# Patient Record
Sex: FEMALE | Race: WHITE
[De-identification: names, ages, dates, MRNs, and addresses within clinical notes are randomized per-mention and may not be internally consistent; named-entity substitution may affect disease eponyms.]

---

## 2017-02-10 ENCOUNTER — HOSPITAL ENCOUNTER (INPATIENT)
Dept: HOSPITAL 47 - EC | Age: 62
LOS: 4 days | Discharge: HOME | DRG: 176 | End: 2017-02-14
Payer: COMMERCIAL

## 2017-02-10 DIAGNOSIS — I26.99: Primary | ICD-10-CM

## 2017-02-10 DIAGNOSIS — Z88.0: ICD-10-CM

## 2017-02-10 DIAGNOSIS — Z96.653: ICD-10-CM

## 2017-02-10 DIAGNOSIS — M19.90: ICD-10-CM

## 2017-02-10 DIAGNOSIS — E03.9: ICD-10-CM

## 2017-02-10 DIAGNOSIS — I82.432: ICD-10-CM

## 2017-02-10 DIAGNOSIS — Z87.891: ICD-10-CM

## 2017-02-10 DIAGNOSIS — Z82.49: ICD-10-CM

## 2017-02-10 DIAGNOSIS — E78.5: ICD-10-CM

## 2017-02-10 DIAGNOSIS — Z79.899: ICD-10-CM

## 2017-02-10 DIAGNOSIS — I10: ICD-10-CM

## 2017-02-10 DIAGNOSIS — I27.2: ICD-10-CM

## 2017-02-10 DIAGNOSIS — I87.8: ICD-10-CM

## 2017-02-10 LAB
ALP SERPL-CCNC: 69 U/L (ref 38–126)
ALT SERPL-CCNC: 65 U/L (ref 9–52)
ANION GAP SERPL CALC-SCNC: 7 MMOL/L
APTT BLD: 22.5 SEC (ref 22–30)
AST SERPL-CCNC: 48 U/L (ref 14–36)
BASOPHILS # BLD AUTO: 0.1 K/UL (ref 0–0.2)
BASOPHILS NFR BLD AUTO: 1 %
BUN SERPL-SCNC: 16 MG/DL (ref 7–17)
CALCIUM SPEC-MCNC: 9.8 MG/DL (ref 8.4–10.2)
CH: 30.9
CHCM: 32.8
CHLORIDE SERPL-SCNC: 104 MMOL/L (ref 98–107)
CK SERPL-CCNC: 54 U/L (ref 30–135)
CO2 SERPL-SCNC: 28 MMOL/L (ref 22–30)
EOSINOPHIL # BLD AUTO: 0.1 K/UL (ref 0–0.7)
EOSINOPHIL NFR BLD AUTO: 2 %
ERYTHROCYTE [DISTWIDTH] IN BLOOD BY AUTOMATED COUNT: 4.72 M/UL (ref 3.8–5.4)
ERYTHROCYTE [DISTWIDTH] IN BLOOD: 12.9 % (ref 11.5–15.5)
GLUCOSE SERPL-MCNC: 135 MG/DL (ref 74–99)
HCT VFR BLD AUTO: 44.5 % (ref 34–46)
HDW: 2.45
HGB BLD-MCNC: 14.4 GM/DL (ref 11.4–16)
INR PPP: 1 (ref ?–1.1)
LUC NFR BLD AUTO: 3 %
LYMPHOCYTES # SPEC AUTO: 1.3 K/UL (ref 1–4.8)
LYMPHOCYTES NFR SPEC AUTO: 19 %
MAGNESIUM SPEC-SCNC: 2.1 MG/DL (ref 1.6–2.3)
MCH RBC QN AUTO: 30.5 PG (ref 25–35)
MCHC RBC AUTO-ENTMCNC: 32.3 G/DL (ref 31–37)
MCV RBC AUTO: 94.3 FL (ref 80–100)
MONOCYTES # BLD AUTO: 0.4 K/UL (ref 0–1)
MONOCYTES NFR BLD AUTO: 6 %
NEUTROPHILS # BLD AUTO: 4.9 K/UL (ref 1.3–7.7)
NEUTROPHILS NFR BLD AUTO: 70 %
NON-AFRICAN AMERICAN GFR(MDRD): >60
POTASSIUM SERPL-SCNC: 5.2 MMOL/L (ref 3.5–5.1)
PROT SERPL-MCNC: 7.1 G/DL (ref 6.3–8.2)
PT BLD: 10.6 SEC (ref 9–12)
SODIUM SERPL-SCNC: 139 MMOL/L (ref 137–145)
TROPONIN I SERPL-MCNC: 0.38 NG/ML (ref 0–0.03)
WBC # BLD AUTO: 0.19 10*3/UL
WBC # BLD AUTO: 7 K/UL (ref 3.8–10.6)
WBC (PEROX): 7.91

## 2017-02-10 PROCEDURE — 93005 ELECTROCARDIOGRAM TRACING: CPT

## 2017-02-10 PROCEDURE — 93306 TTE W/DOPPLER COMPLETE: CPT

## 2017-02-10 PROCEDURE — 85730 THROMBOPLASTIN TIME PARTIAL: CPT

## 2017-02-10 PROCEDURE — 71020: CPT

## 2017-02-10 PROCEDURE — 84484 ASSAY OF TROPONIN QUANT: CPT

## 2017-02-10 PROCEDURE — 80048 BASIC METABOLIC PNL TOTAL CA: CPT

## 2017-02-10 PROCEDURE — 99291 CRITICAL CARE FIRST HOUR: CPT

## 2017-02-10 PROCEDURE — 36415 COLL VENOUS BLD VENIPUNCTURE: CPT

## 2017-02-10 PROCEDURE — 80053 COMPREHEN METABOLIC PANEL: CPT

## 2017-02-10 PROCEDURE — 85379 FIBRIN DEGRADATION QUANT: CPT

## 2017-02-10 PROCEDURE — 83880 ASSAY OF NATRIURETIC PEPTIDE: CPT

## 2017-02-10 PROCEDURE — 85025 COMPLETE CBC W/AUTO DIFF WBC: CPT

## 2017-02-10 PROCEDURE — 96376 TX/PRO/DX INJ SAME DRUG ADON: CPT

## 2017-02-10 PROCEDURE — 82550 ASSAY OF CK (CPK): CPT

## 2017-02-10 PROCEDURE — 71275 CT ANGIOGRAPHY CHEST: CPT

## 2017-02-10 PROCEDURE — 93970 EXTREMITY STUDY: CPT

## 2017-02-10 PROCEDURE — 94640 AIRWAY INHALATION TREATMENT: CPT

## 2017-02-10 PROCEDURE — 83735 ASSAY OF MAGNESIUM: CPT

## 2017-02-10 PROCEDURE — 85610 PROTHROMBIN TIME: CPT

## 2017-02-10 PROCEDURE — 82553 CREATINE MB FRACTION: CPT

## 2017-02-10 RX ADMIN — SODIUM CHLORIDE SCH MLS/HR: 450 INJECTION, SOLUTION INTRAVENOUS at 16:03

## 2017-02-10 RX ADMIN — CEFAZOLIN SCH MLS/HR: 330 INJECTION, POWDER, FOR SOLUTION INTRAMUSCULAR; INTRAVENOUS at 16:03

## 2017-02-10 RX ADMIN — IPRATROPIUM BROMIDE AND ALBUTEROL SULFATE SCH: .5; 3 SOLUTION RESPIRATORY (INHALATION) at 16:14

## 2017-02-10 RX ADMIN — ATORVASTATIN CALCIUM SCH MG: 20 TABLET, FILM COATED ORAL at 21:21

## 2017-02-10 RX ADMIN — IPRATROPIUM BROMIDE AND ALBUTEROL SULFATE SCH ML: .5; 3 SOLUTION RESPIRATORY (INHALATION) at 19:10

## 2017-02-10 RX ADMIN — IPRATROPIUM BROMIDE AND ALBUTEROL SULFATE SCH: .5; 3 SOLUTION RESPIRATORY (INHALATION) at 19:44

## 2017-02-10 RX ADMIN — IPRATROPIUM BROMIDE AND ALBUTEROL SULFATE SCH: .5; 3 SOLUTION RESPIRATORY (INHALATION) at 19:59

## 2017-02-10 NOTE — CT
EXAMINATION TYPE: CT angio chest

 

DATE OF EXAM: 2/10/2017 3:08 PM

 

COMPARISON: NONE

 

HISTORY: Left sided chest pain and difficulty breathing.

 

CT DLP: 621.00 mGycm

Automated exposure control for dose reduction was used.

 

CONTRAST: 

CTA scan of the thorax is performed with IV Contrast, patient injected with 100 mL of Omnipaque 300, 
pulmonary embolism protocol. .  

 

FINDINGS: The lungs are clear.

 

There is no significant axillary, internal mammary, mediastinal or hilar adenopathy.

 

There is extensive, bilateral pulmonary emboli. There is no significant evidence of right heart strai
n. The heart is enlarged. There is no pleural or pericardial fluid.

 

There is been a previous cholecystectomy. Visualized portions of the upper abdomen are otherwise norm
al.

 

There is hypertrophic spondylosis within the spine.

 

IMPRESSION: 

 

THIS EXAMINATION IS POSITIVE FOR EXTENSIVE MAIN PULMONARY ARTERY AND LOWER LOBE PULMONARY ARTERY EMBO
LI BILATERALLY.

## 2017-02-10 NOTE — ED
Chest Pain HPI





- General


Chief Complaint: Chest Pain


Stated Complaint: Chest Pain/SOB


Time Seen by Provider: 02/10/17 12:11


Source: patient, family, RN notes reviewed


Mode of arrival: wheelchair


Limitations: no limitations





- History of Present Illness


Initial Comments: 





This is a 61-year-old female with a necessity benign past medical history other 

than hypertension who did quit smoking about 30 years ago who states she's had 

for the past 2 weeks evidence of a racing heart and shortness of breath 

especially when climbing stairs curiously also is had a cold and cough symptoms 

she has had in the morning cough with greenish brown phlegm.  She's had no 

overt fevers chills or sweats.  Also of note however she states she just drove 

back from [place] about 2 weeks ago when the symptoms did start.  She 

denies any overt chest pain at this time she has intermittent edema in her left 

ankle but this in the [place] for many years she denies any other complaints he 

denies any leg or calf pain abdominal pain or other symptoms.  She does 

complain however of being short of breath even at rest at this time.


MD Complaint: other





- Related Data


 Home Medications











 Medication  Instructions  Recorded  Confirmed


 


Atorvastatin [Lipitor] 20 mg PO HS 02/10/17 02/10/17


 


Levothyroxine Sodium [Synthroid] 150 mcg PO DAILY 02/10/17 02/10/17


 


Metoprolol Tartrate [Lopressor] 50 mg PO DAILY 02/10/17 02/10/17











 Allergies











Allergy/AdvReac Type Severity Reaction Status Date / Time


 


Penicillins Allergy  Swelling Verified 02/10/17 12:53














Review of Systems


ROS Statement: 


Those systems with pertinent positive or pertinent negative responses have been 

documented in the HPI.





ROS Other: All systems not noted in ROS Statement are negative.





EKG Findings





- EKG Results:


EKG: interpreted by HANSA, sinus rhythm (Normal sinus rhythm rate of 76.  

Interval 134 QRS duration 78 QT/QTC of/459 appear to be normal EKG no acute ST-

T wave elevations or depressions.)





Past Medical History


Past Medical History: Chest Pain / Angina, Hyperlipidemia, Thyroid Disorder


History of Any Multi-Drug Resistant Organisms: None Reported


Past Surgical History:  Section, Cholecystectomy, Orthopedic Surgery


Past Psychological History: No Psychological Hx Reported


Smoking Status: Former smoker


Past Alcohol Use History: None Reported


Past Drug Use History: None Reported





General Exam





- General Exam Comments


Initial Comments: 





This is a well-developed well-nourished awake alert oriented 3 female


Limitations: no limitations


General appearance: alert, in no apparent distress


Head exam: Present: atraumatic, normocephalic, normal inspection


Eye exam: Present: normal appearance, PERRL, EOMI.  Absent: scleral icterus, 

conjunctival injection, periorbital swelling


ENT exam: Present: normal exam, mucous membranes moist


Neck exam: Present: normal inspection.  Absent: tenderness, meningismus, 

lymphadenopathy


Respiratory exam: Present: decreased breath sounds.  Absent: respiratory 

distress, wheezes, rales, rhonchi, stridor


Cardiovascular Exam: Present: regular rate, normal rhythm, normal heart sounds.

  Absent: systolic murmur, diastolic murmur, rubs, gallop, clicks


GI/Abdominal exam: Present: soft, normal bowel sounds.  Absent: distended, 

tenderness, guarding, rebound, rigid


Extremities exam: Present: normal inspection, full ROM, normal capillary 

refill.  Absent: tenderness, pedal edema, joint swelling, calf tenderness


Back exam: Present: normal inspection


Neurological exam: Present: alert, oriented X3, CN II-XII intact


Psychiatric exam: Present: normal affect, normal mood


Skin exam: Present: warm, dry, intact, normal color.  Absent: rash





Course


 Vital Signs











  02/10/17 02/10/17 02/10/17





  12:05 12:18 13:18


 


Temperature 98.8 F 97.4 F L 


 


Pulse Rate 78 70 81


 


Respiratory 20 24 





Rate   


 


Blood Pressure 154/80 126/73 


 


O2 Sat by Pulse 94 L 97 





Oximetry   














  02/10/17 02/10/17 02/10/17





  13:25 13:27 13:32


 


Temperature   98.1 F


 


Pulse Rate 82 80 


 


Respiratory 20  





Rate   


 


Blood Pressure 131/77  


 


O2 Sat by Pulse 97  





Oximetry   














  02/10/17 02/10/17 02/10/17





  14:05 14:07 14:47


 


Temperature   98.4 F


 


Pulse Rate 102 H 86 84


 


Respiratory 24  24





Rate   


 


Blood Pressure 136/79  128/70


 


O2 Sat by Pulse 89 L 94 L 97





Oximetry   














  02/10/17





  15:08


 


Temperature 


 


Pulse Rate 86


 


Respiratory 





Rate 


 


Blood Pressure 147/74


 


O2 Sat by Pulse 98





Oximetry 














- Reevaluation(s)


Reevaluation #1: 





02/10/17 15:38


Patient has no change in her status on reexamination no chest pain minimal 

dyspnea on resting she did go to the bathroom and did develop dyspnea however 

on exertion





Chest Pain MDM





- MDM





I did review the x-rays which are nonspecific except for nodule left upper lobe 

I did do a CAT scan of the patient there is evidence of bilateral pulmonary 

emboli I did discuss this with the radiologist.  I also discussed this with the 

patient and her .  Is also elevation of the troponin.  I did discuss the 

case with Dr. Baez.  Patient will be admitted with consultation by cardiology 

and pulmonary medicine.  Patient was started on high-dose heparin.





Critical Care Time


Critical Care Time: Yes


Critical Care Time: 





39 minutes of critical care time including initial history physical lab and x-

ray orders repeat examination with CAT scan.  Repeat examination of the patient 

is had with the patient and her  regarding findings discussed with the 

admitting physician.  Admission orders and documentation of the above.





Disposition


Clinical Impression: 


 Pulmonary embolism, Non-ST elevation myocardial infarction (NSTEMI), Dyspnea 

on exertion





Disposition: ADMITTED AS IP TO THIS HOSP


Condition: Stable

## 2017-02-10 NOTE — XR
EXAMINATION TYPE: XR chest 2V

 

DATE OF EXAM: 2/10/2017 1:12 PM

 

HISTORY:  Difficulty breathing.

 

REFERENCE: NONE.

 

FINDINGS: The heart is enlarged. There is a 2 cm nodular opacity projecting over the left upper chest
. The lungs are otherwise clear. Pleural spaces are clear.

 

IMPRESSION: 

1. CARDIOMEGALY.

2. 2 CM PULMONARY NODULE IN THE LEFT UPPER LOBE. A CT SCAN OF THE CHEST WOULD BE SUGGESTED.

## 2017-02-11 RX ADMIN — CEFAZOLIN SCH MLS/HR: 330 INJECTION, POWDER, FOR SOLUTION INTRAMUSCULAR; INTRAVENOUS at 15:57

## 2017-02-11 RX ADMIN — ATORVASTATIN CALCIUM SCH MG: 20 TABLET, FILM COATED ORAL at 21:36

## 2017-02-11 RX ADMIN — IPRATROPIUM BROMIDE AND ALBUTEROL SULFATE SCH ML: .5; 3 SOLUTION RESPIRATORY (INHALATION) at 15:20

## 2017-02-11 RX ADMIN — IPRATROPIUM BROMIDE AND ALBUTEROL SULFATE SCH ML: .5; 3 SOLUTION RESPIRATORY (INHALATION) at 11:38

## 2017-02-11 RX ADMIN — IPRATROPIUM BROMIDE AND ALBUTEROL SULFATE SCH ML: .5; 3 SOLUTION RESPIRATORY (INHALATION) at 19:16

## 2017-02-11 RX ADMIN — LEVOTHYROXINE SODIUM SCH MCG: 75 TABLET ORAL at 05:43

## 2017-02-11 RX ADMIN — IPRATROPIUM BROMIDE AND ALBUTEROL SULFATE SCH ML: .5; 3 SOLUTION RESPIRATORY (INHALATION) at 07:30

## 2017-02-11 RX ADMIN — SODIUM CHLORIDE SCH MLS/HR: 450 INJECTION, SOLUTION INTRAVENOUS at 15:56

## 2017-02-11 RX ADMIN — SODIUM CHLORIDE SCH MLS/HR: 450 INJECTION, SOLUTION INTRAVENOUS at 04:51

## 2017-02-11 RX ADMIN — METOPROLOL TARTRATE SCH MG: 50 TABLET, FILM COATED ORAL at 08:40

## 2017-02-11 NOTE — CONS
DATE OF CONSULTATION:   



CHIEF COMPLAINT: Shortness of breath, palpitations and syncope. 



Fabienne is a 61-year-old lady with history of hypothyroidism, 

dyslipidemia, and palpitations who presented to the hospital having 

had an episode of sustained palpitations, chest tightness and syncope 

while she was having a bowel movement. The patient has been having on 

and off episodes of palpitations,  but the most recent episode came on 

at rest with severe in intensity and without any clear-cut relieving 

or exacerbating factors. She came to hospital. Her EKG did not reveal 

ischemic changes. She has had troponin that was elevated. A D-dimer 

that was 5.22, went on to have a CT scan of the lungs that showed 

extensive pulmonary embolism involving the main pulmonary artery and 

lower lobe pulmonary arteries. She is treated with intravenous heparin 

and at the time of my evaluation this morning appears comfortable at 

rest. Does not seem to be in respiratory distress. She is not 

tachycardic and remains hemodynamically stable. Chest x-ray showed 

mild cardiomegaly.  



Past medical history is significant for hypothyroidism, dyslipidemia, 

hypertension and palpitations.  



Medications at home include Lopressor 50 mg daily, Synthroid and 

Lipitor.  



Allergic to PENICILLIN. 



Family history is negative for premature coronary artery disease. 



SOCIAL HISTORY: Negative for smoking, EtOH abuse or drug abuse. 



REVIEW OF SYSTEMS: 

HEENT: Unremarkable. 

CARDIAC: As described above. 

RESPIRATORY: As described above. 

GI: Negative. 

GENITOURINARY: Negative. 

ALLERGY/IMMUNOLOGY: Negative. 

MUSCULOSKELETAL:  Negative. 

ENDOCRINE: Negative. 

HEMATOLOGICAL:  Negative. 

DERMATOLOGY: Negative. 

CONSTITUTIONAL: Negative. 

ONCOLOGICAL: Negative. 



The rest of the system review is not relevant. 



On exam, comfortable at rest. Vital signs are stable. There is no 

jugular venous distention. Chest exam reveals good air entry 

bilaterally. Heart exam reveals first and second heart sounds. No 

gallop. No murmur. Abdomen is soft, nontender. Exam of extremities did 

not reveal edema. Peripheral pulses are felt.  



Labs are as described. Hemoglobin is 14.4. Creatinine is 0.9. 

Troponins elevated as described above. BNP is 101.  



ASSESSMENT: 

1. Syncope secondary to large pulmonary embolism. 

2. Palpitations, probably related to it. 



PLAN: I am going to obtain a 2-D echo to assess LV function and to 

assess her RV strain, and pulmonary hypertension. She is on heparin 

which we are going to continue and we can switch her to Xarelto. I 

will obtain a venous duplex study to rule out DVT.

## 2017-02-11 NOTE — ECHOF
Referral Reason:PE



MEASUREMENTS

--------

HEIGHT: 167.6 cm

WEIGHT: 117.9 kg

BP: 112/82

IVSd:   1.0 cm     (0.6 - 1.1)

LVIDd:   4.1 cm     (3.9 - 5.3)

LVPWd:   1.0 cm     (0.6 - 1.1)

LVIDs:   2.8 cm

LA Diam:   3.3 cm     (2.7 - 3.8)

RVIDd:   3.6 cm     (< 3.3)

LAESV Index (A-L):   17.42 ml/m

Ao Diam:   3.0 cm     (2.0 - 3.7)

AV Cusp:   2.2 cm     (1.5 - 2.6)

EPSS:   0.5 cm

MV E Everardo:   0.47 m/s

MV DecT:   271 ms

MV A Everardo:   0.75 m/s

MV E/A Ratio:   0.62 

RAP:   5.00 mmHg

RVSP:   37.00 mmHg

MV EF SLOPE:   47.02 mm/s     (70 - 150)

MV EXCURSION:   22.13 mm     (> 18.000)







FINDINGS

--------

Sinus rhythm.

This was a technically adequate study.

The left ventricular size is normal.   Left ventricular 

wall thickness is normal.   Overall left ventricular 

systolic function is normal with, an EF between 60 - 65 

%.

The right ventricle is mildly enlarged.

Normal LA  size by volume 22+/-6 ml/m2.

The right atrium is normal in size.

The aortic valve is trileaflet and appears structurally 

normal.

Mild mitral annular calcification present.

Mild tricuspid regurgitation present.   There is mild 

pulmonary hypertension.   The right ventricular 

systolic pressure, as measured by Doppler, is 37.00mmHg.

The pulmonic valve is normal.   There is no pulmonic 

regurgitation present.

The aortic root size is normal.

There is no pericardial effusion.



CONCLUSIONS

--------

1. Sinus rhythm.

2. Mild mitral annular calcification present.

3. Mild tricuspid regurgitation present.

4. There is mild pulmonary hypertension.

5. The right ventricular systolic pressure, as measured by 

Doppler, is 37.00mmHg.

6. The pulmonic valve is normal.

7. There is no pericardial effusion.

8. This was a technically adequate study.

9. The left ventricular size is normal.

10. Left ventricular wall thickness is normal.

11. Overall left ventricular systolic function is normal 

with, an EF between 60 - 65 %.

12. The right ventricle is mildly enlarged.

13. Normal LA size by volume 22+/-6 ml/m2.

14. The right atrium is normal in size.

15. The aortic valve is trileaflet and appears structurally 

normal.





SONOGRAPHER: Amanda Valencia RDCS

## 2017-02-11 NOTE — P.CNPUL
History of Present Illness


Consult date: 17


Requesting physician: Erich Baez


Reason for consult: pulmonary embolism


Chief complaint: Chest pain and shortness of breath, and syncope


History of present illness: 


This is a 62-year-old female with history of hypertension, degenerative joint 

disease and previous bilateral knee replacements years ago, history of 

hyperlipidemia, primarily a patient of Dr. Mae in [place].  Patient has 

been traveling by car for the last 2 weeks to [place] and back to 

[place].  And over the last 2 weeks she has been experiencing episodes of 

chest tightness, and shortness of breath with palpitations.  When patient came 

back to [place], she continued to have these symptoms especially with any 

exertion and more so when she was climbing any flight of stairs.  Patient had 

an episode of syncope as she was on the toilet.  Hence she was brought into the 

ER.  Patient was noted to have elevated d-dimer, chest x-ray was unremarkable, 

but CT of the chest showed bilateral pulmonary emboli.  Patient was started on 

heparin, she was hemodynamically stable, she had some troponin leak, and she 

did not require TPA.  Patient was admitted, and this consult was initiated.  No 

previous history of pulmonary embolism or DVT.  No previous history of 

hypercoagulable state.  No previous history of underlying malignancy.  And no 

family history of hypercoagulability.  No history of connective tissue disease.

  Presently patient is doing well, no cough no wheezing no shortness of breath 

no chest pain.  Patient has no shortness of breath at rest.  No headaches no 

blurred vision no dizziness.  No nausea no vomiting no abdominal pain no melena 

no hematemesis.  No dysuria and no frequency no urgency.








Review of Systems





14 point review of systems were obtained, please refer to pertinent positives 

and negatives in HPI.





Past Medical History


Past Medical History: Chest Pain / Angina, Hyperlipidemia, Osteoarthritis (OA), 

Pneumonia, Thyroid Disorder


Additional Past Medical History / Comment(s): PAPITATIONS,


History of Any Multi-Drug Resistant Organisms: None Reported


Past Surgical History:  Section, Cholecystectomy, Hernia Repair, 

Orthopedic Surgery, Tonsillectomy


Additional Past Surgical History / Comment(s): ONE DENTAL IMPLANT, COLONOSCOPY,

HEMORRHOIDECTOMY,PILONIDAL CYST REMOVED AGE 17, CERVICAL BX-PRE CANCEROUS CELLS-

HAD PROCEDURE , HAD BROKEN IUD REMOVED AND PT STATED THEY WENT IN LIKE HAVING A 

 TO REMOVED BROKEN PEICES,DONNA KNEE REPLACEMENTS


Past Anesthesia/Blood Transfusion Reactions: No Reported Reaction


Past Psychological History: No Psychological Hx Reported


Smoking Status: Former smoker


Past Alcohol Use History: Rare


Additional Past Alcohol Use History / Comment(s): STARTED SMOKING AT AGE 17, 

SMOKED 1PPD ,QUIT 


Past Drug Use History: None Reported





- Past Family History


  ** Father


Family Medical History: Congestive Heart Failure (CHF)


Additional Family Medical History / Comment(s):  AGE 78





  ** Mother


Additional Family Medical History / Comment(s): PARKINSONS- AGE 74.     AN 

AUNT HAD BRAIN AND ABD ANUERYSM.





Medications and Allergies


 Home Medications











 Medication  Instructions  Recorded  Confirmed  Type


 


Atorvastatin [Lipitor] 20 mg PO HS 02/10/17 02/10/17 History


 


Levothyroxine Sodium [Synthroid] 150 mcg PO DAILY 02/10/17 02/10/17 History


 


Metoprolol Tartrate [Lopressor] 50 mg PO DAILY 02/10/17 02/10/17 History











 Allergies











Allergy/AdvReac Type Severity Reaction Status Date / Time


 


Penicillins Allergy  Swelling Verified 02/10/17 12:53














Physical Exam


Vitals: 


 Vital Signs











  Temp Pulse Pulse Pulse Resp BP BP


 


 17 11:49   86     


 


 17 11:38   86     


 


 17 08:00  98.3 F    86  18   112/82


 


 17 07:37   85     


 


 17 07:30   85     


 


 17 04:00     81  18   118/71


 


 17 00:00     86  18   122/70


 


 02/10/17 20:00  98.5 F   87  92  18   123/68


 


 02/10/17 19:20   88     


 


 02/10/17 19:10   88     


 


 02/10/17 17:10  98.2 F   87   16   140/87


 


 02/10/17 16:10  98.1 F  88    20  134/66 














  Pulse Ox


 


 17 11:49 


 


 17 11:38 


 


 17 08:00  95


 


 17 07:37 


 


 17 07:30 


 


 17 04:00  96


 


 17 00:00  95


 


 02/10/17 20:00  97


 


 02/10/17 19:20 


 


 02/10/17 19:10 


 


 02/10/17 17:10  94 L


 


 02/10/17 16:10  97








 Intake and Output











 02/10/17 02/11/17 02/11/17





 22:59 06:59 14:59


 


Intake Total 180 544.232 100


 


Balance 180 544.232 100


 


Intake:   


 


  IV   60


 


    Heparin Sodium,Porcine/   60





    D5w Pmx 25,000 unit In   





    Dextrose/Water 1 500ml.   





    bag @ 18 UNITS/KG/HR 42.   





    45 mls/hr IV .[id number] ANDREW   





    Rx#:[id number]   


 


  Intake, IV Titration  544.232 40





  Amount   


 


    Heparin Sodium,Porcine/  544.232 





    D5w Pmx 25,000 unit In   





    Dextrose/Water 1 500ml.   





    bag @ 18 UNITS/KG/HR 42.   





    45 mls/hr IV .[id number] ANDREW   





    Rx#:[id number]   


 


    Sodium Chloride 0.9% 1,   40





    000 ml @ 20 mls/hr IV .   





    Q24H ANDREW Rx#:[id number]   


 


  Oral 180  0


 


Other:   


 


  # Voids 0 0 0


 


  Weight   117.934 kg








 Patient Weight











 17





 06:59


 


Weight 117.934 kg














Physical Exam


HEENT:[Neck is supple.] [No neck masses.] [No thyromegaly.] [No JVD.]


Chest: [Clear throughout, no crackles, no rhonchi, no wheezes.]


Cardiac Exam: [Normal S1 and S2, no S3 gallop, no murmur.]


Abdomen: [Soft, nontender,  no megaly, no rebound, no guarding, normal bowel 

sounds.]


Extremities: [No clubbing, slight edema noted in the left lower extremity above 

the left ankle and according to the patient that is chronic., no cyanosis.]


Neurological Exam: [No focal neurologic deficit.]





Results





- Laboratory Findings


CBC and BMP: 


 02/10/17 13:14





 02/10/17 13:14


PT/INR, D-dimer











PT  10.6 sec (9.0-12.0)   02/10/17  13:14    


 


INR  1.0  (<1.1)   02/10/17  13:14    


 


D-Dimer  5.22 mg/L FEU (<0.60)  H  02/10/17  13:14    








Abnormal lab findings: 


 Abnormal Labs











  02/10/17 02/10/17 02/11/17





  19:15 22:12 00:50


 


APTT   94.8 H 


 


Troponin I  0.544 H*   0.423 H*














  02/11/17





  05:30


 


APTT  62.8 H


 


Troponin I 














- Diagnostic Findings


CT scan - chest: image reviewed (Multiple bilateral pulmonary emboli noted.)





Assessment and Plan


Plan: 





Impression:





1 acute bilateral pulmonary embolism, provoked by recent long car travel.  And 

venous stasis.





2 acute troponin leak most likely secondary to the volume load of the pulmonary 

emboli.





3 history of multiple comorbidities including essential hypertension, 

hyperlipidemia, hypothyroidism, and history of degenerative joint disease.





Recommendation: Patient is yet to have an echocardiogram today, and she is yet 

to have bilateral venous Doppler, in the meantime I fully agree with the 

treatment plan, however the patient demonstrates any hemodynamic instability 

whatsoever, I would be inclined to start the patient on TPA.  At least at this 

point she is stable enough and does not require TPA.  We'll continue to follow 

closely.  Early next week, the patient could be started on possibly Xarelto.  

Length of treatment would have to be at least a 3-6 months.


Time with Patient: Greater than 30

## 2017-02-11 NOTE — P.HPIM
History of Present Illness


H&P Date: 17


Chief Complaint: Chest pain and shortness of breath





 Patient is a 61 year old female who presented to ER with a chief complaint of 

chest pain and shortness of breath she states that she traveled from [place] 

[place] to [place] in a car which was at 10-12 hour right 2 weeks ago she 

started having episodes of chest pain and feeling her heart racing she started 

having worsening shortness of breath eventually she decided to come to 

emergency room she was evaluated in the emergency room her d-dimer was 

significantly elevated at 5.22, computed tomography scan angiogram of the chest 

was positive for extensive bilateral pulmonary embolism she was started on IV 

heparin and was admitted to the telemetry floor.  Pulmonary consultation was 

requested.  Patient also had slightly elevated troponin level.





Past medical and surgical history is significant for


#1 history of hypertension


#2 history of osteoarthritis, status post bilateral knee surgeries


#3 history of cholecystectomy


#4 history of hernia repair


#5 history of hyperlipidemia maintained on Lipitor





Social history


Patient lives at home she is able to ambulate and take care of her daily needs 

she use to smoke she quit 30 years ago she drinks alcohol rarely she denies any 

drug use





Past Medical History


Past Medical History: Chest Pain / Angina, Hyperlipidemia, Osteoarthritis (OA), 

Pneumonia, Thyroid Disorder


Additional Past Medical History / Comment(s): PAPITATIONS,


History of Any Multi-Drug Resistant Organisms: None Reported


Past Surgical History:  Section, Cholecystectomy, Hernia Repair, 

Orthopedic Surgery, Tonsillectomy


Additional Past Surgical History / Comment(s): ONE DENTAL IMPLANT, COLONOSCOPY,

HEMORRHOIDECTOMY,PILONIDAL CYST REMOVED AGE 17, CERVICAL BX-PRE CANCEROUS CELLS-

HAD PROCEDURE , HAD BROKEN IUD REMOVED AND PT STATED THEY WENT IN LIKE HAVING A 

 TO REMOVED BROKEN PEICES,DONNA KNEE REPLACEMENTS


Past Anesthesia/Blood Transfusion Reactions: No Reported Reaction


Past Psychological History: No Psychological Hx Reported


Smoking Status: Former smoker


Past Alcohol Use History: Rare


Additional Past Alcohol Use History / Comment(s): STARTED SMOKING AT AGE 17, 

SMOKED 1PPD ,QUIT 


Past Drug Use History: None Reported





- Past Family History


  ** Father


Family Medical History: Congestive Heart Failure (CHF)


Additional Family Medical History / Comment(s):  AGE 78





  ** Mother


Additional Family Medical History / Comment(s): PARKINSONS- AGE 74.     AN 

AUNT HAD BRAIN AND ABD ANUERYSM.





Medications and Allergies


 Home Medications











 Medication  Instructions  Recorded  Confirmed  Type


 


Atorvastatin [Lipitor] 20 mg PO HS 02/10/17 02/10/17 History


 


Levothyroxine Sodium [Synthroid] 150 mcg PO DAILY 02/10/17 02/10/17 History


 


Metoprolol Tartrate [Lopressor] 50 mg PO DAILY 02/10/17 02/10/17 History











 Allergies











Allergy/AdvReac Type Severity Reaction Status Date / Time


 


Penicillins Allergy  Swelling Verified 02/10/17 12:53














Physical Exam


Vitals: 


 Vital Signs











  Temp Pulse Pulse Pulse Resp BP BP


 


 17 08:00  98.3 F    86  18   112/82


 


 17 07:37   85     


 


 17 07:30   85     


 


 17 04:00     81  18   118/71


 


 17 00:00     86  18   122/70


 


 02/10/17 20:00  98.5 F   87  92  18   123/68


 


 02/10/17 19:20   88     


 


 02/10/17 19:10   88     


 


 02/10/17 17:10  98.2 F   87   16   140/87


 


 02/10/17 16:10  98.1 F  88    20  134/66 














  Pulse Ox


 


 17 08:00  95


 


 17 07:37 


 


 17 07:30 


 


 17 04:00  96


 


 17 00:00  95


 


 02/10/17 20:00  97


 


 02/10/17 19:20 


 


 02/10/17 19:10 


 


 02/10/17 17:10  94 L


 


 02/10/17 16:10  97








 Intake and Output











 02/10/17 02/11/17 02/11/17





 22:59 06:59 14:59


 


Intake Total 180 544.232 100


 


Balance 180 544.232 100


 


Intake:   


 


  IV   60


 


    Heparin Sodium,Porcine/   60





    D5w Pmx 25,000 unit In   





    Dextrose/Water 1 500ml.   





    bag @ 18 UNITS/KG/HR 42.   





    45 mls/hr IV .[id number] [facility]   





    Rx#:[id number]   


 


  Intake, IV Titration  544.232 40





  Amount   


 


    Heparin Sodium,Porcine/  544.232 





    D5w Pmx 25,000 unit In   





    Dextrose/Water 1 500ml.   





    bag @ 18 UNITS/KG/HR 42.   





    45 mls/hr IV .[id number] [facility]   





    Rx#:[id number]   


 


    Sodium Chloride 0.9% 1,   40





    000 ml @ 20 mls/hr IV .   





    Q24H [facility] Rx#:[id number]   


 


  Oral 180  0


 


Other:   


 


  # Voids 0 0 0


 


  Weight   117.934 kg








 Patient Weight











 17





 06:59


 


Weight 117.934 kg














In general patient is alert and oriented 3 in no apparent distress


HEENT head normocephalic and atraumatic


Neck is supple no JVD no goiter no lymphadenopathy


Chest exam reveals a scattered crackles bilaterally no wheezing


Cardiac exam reveals regular heart sounds S1 and S2 no gallops no murmurs


Abdomen is soft nontender no organomegaly was normal bowel sounds


Extremity exam reveals minimal edema especially in the left ankle area no 

cyanosis or clubbing





Results


CBC & Chem 7: 


 02/10/17 13:14





 02/10/17 13:14


Labs: 


 Abnormal Lab Results - Last 24 Hours (Table)











  02/10/17 02/10/17 02/11/17 Range/Units





  19:15 22:12 00:50 


 


APTT   94.8 H   (22.0-30.0)  sec


 


Troponin I  0.544 H*   0.423 H*  (0.000-0.034)  ng/mL














  17 Range/Units





  05:30 


 


APTT  62.8 H  (22.0-30.0)  sec


 


Troponin I   (0.000-0.034)  ng/mL














Assessment and Plan


Plan: 





#1 bilateral pulmonary embolism, patient started on IV heparin, pulmonary 

consultation was requested


#2 slight elevation in troponin level, cardiology consultation was requested 

likely related to pulmonary embolism


#3 underlying history of hypertension


#4 underlying history of hyperlipidemia


#5 underlying history of hypothyroidism


#6 remote history of smoking quit 30 years ago


#7 underlying history of osteoarthritis


At this time continue was current medications, will assess if patient insurance 

pays for any of the new anticoagulation medications, if not patient will be 

started on Coumadin.

## 2017-02-12 RX ADMIN — SODIUM CHLORIDE SCH MLS/HR: 450 INJECTION, SOLUTION INTRAVENOUS at 23:05

## 2017-02-12 RX ADMIN — IPRATROPIUM BROMIDE AND ALBUTEROL SULFATE SCH ML: .5; 3 SOLUTION RESPIRATORY (INHALATION) at 20:39

## 2017-02-12 RX ADMIN — METOPROLOL TARTRATE SCH MG: 50 TABLET, FILM COATED ORAL at 09:04

## 2017-02-12 RX ADMIN — ATORVASTATIN CALCIUM SCH MG: 20 TABLET, FILM COATED ORAL at 20:58

## 2017-02-12 RX ADMIN — IPRATROPIUM BROMIDE AND ALBUTEROL SULFATE SCH ML: .5; 3 SOLUTION RESPIRATORY (INHALATION) at 16:24

## 2017-02-12 RX ADMIN — SODIUM CHLORIDE SCH MLS/HR: 450 INJECTION, SOLUTION INTRAVENOUS at 03:55

## 2017-02-12 RX ADMIN — LEVOTHYROXINE SODIUM SCH MCG: 75 TABLET ORAL at 05:40

## 2017-02-12 RX ADMIN — CEFAZOLIN SCH MLS/HR: 330 INJECTION, POWDER, FOR SOLUTION INTRAMUSCULAR; INTRAVENOUS at 16:14

## 2017-02-12 RX ADMIN — IPRATROPIUM BROMIDE AND ALBUTEROL SULFATE SCH ML: .5; 3 SOLUTION RESPIRATORY (INHALATION) at 13:18

## 2017-02-12 RX ADMIN — SODIUM CHLORIDE SCH MLS/HR: 450 INJECTION, SOLUTION INTRAVENOUS at 16:13

## 2017-02-12 RX ADMIN — IPRATROPIUM BROMIDE AND ALBUTEROL SULFATE SCH ML: .5; 3 SOLUTION RESPIRATORY (INHALATION) at 09:00

## 2017-02-12 NOTE — PN
Fabienne is an 61-year-old lady who was admitted to hospital with 

bilateral pulmonary embolism, feeling much better on IV heparin, had a 

venous duplex study that revealed left leg, left popliteal vein DVT. 

Patient has history of recent travel. Echocardiogram showed normal LV 

function.  



On exam, comfortable at rest. Vital signs are stable. 

There is no jugular venous distention. Chest exam reveals good air 

entry bilaterally. Heart exam reveals first and second heart sounds. 

No gallop. Exam of extremities did not reveal edema. Peripheral pulses 

are felt.  Occasional rhonchi are heard bilaterally.  



ASSESSMENT: 

1. Acute large bilateral pulmonary embolism. 

2. Left popliteal vein deep venous thrombosis related to recent travel.



PLAN: Will switch her to Xarelto once we know that she is covered for 

it. If not, will start her on Coumadin tomorrow.

## 2017-02-12 NOTE — US
EXAMINATION TYPE: US venous doppler duplex LE 

 

DATE OF EXAM: 2/12/2017 9:44 AM

 

COMPARISON: CTA chest dated 2/10/2017 revealing extensive pulmonary emboli.

 

CLINICAL HISTORY: dvt/pe. No leg symptoms

 

SIDE PERFORMED:

 

VESSELS IMAGED:

External Iliac Vein (EIV)

Common Femoral Vein

Deep Femoral Vein

Greater Saphenous Vein *

Femoral Vein

Popliteal Vein

Small Saphenous Vein *

Proximal Calf Veins

(* superficial vessels)

 

TECHNOLOGIST IMPRESSION:

 

Right Leg:  Appears negative for DVT

 

Left Leg:  Appears to have non occluding thrombus within left proximal popiteal vein = echogenic debr
is that is not compressible, blood flow seen going around clot

 

As described above there is echogenic debris within an enlarged proximal popliteal vein that is nonco
mpressible with peripheral flow seen on Doppler imaging. The distal popliteal and proximal calf veins
 are compressible with no echogenic intraluminal debris.

 

IMPRESSION:  

1. Nonocclusive thrombus within the proximal left popliteal vein in a patient with known pulmonary em
boli.

2. No evidence of deep venous thrombosis of the right lower extremity.

## 2017-02-12 NOTE — P.PN
Subjective


Principal diagnosis: 





Bilateral pulmonary embolism





Patient is a 61-year-old female who presented to [facility] 

was chest congestion and severe shortness of breath that was worsening over the 

last 2 weeks patient stated that she had a long drive from [place] about 

2 weeks ago, subsequently she started having worsening shortness of breath, d-

dimer was elevated at 5.2 on admission, computed tomography scan of the chest 

revealed evidence of bilateral pulmonary embolism she was started on IV heparin

, left lower extremity Doppler was positive for DVT.





Objective





- Vital Signs


Vital signs: 


 Vital Signs











Temp  98.2 F   02/12/17 08:00


 


Pulse  90   02/12/17 09:10


 


Resp  18   02/12/17 08:00


 


BP  105/55   02/12/17 08:00


 


Pulse Ox  98   02/12/17 08:00








 Intake & Output











 02/11/17 02/12/17 02/12/17





 18:59 06:59 18:59


 


Intake Total 873.207 862.532 349.306


 


Output Total   300


 


Balance 873.207 862.532 49.306


 


Weight 117.934 kg 117 kg 


 


Intake:   


 


   280 


 


    Heparin Sodium,Porcine/ 200 280 





    D5w Pmx 25,000 unit In   





    Dextrose/Water 1 500ml.   





    bag @ 18 UNITS/KG/HR 42.   





    45 mls/hr IV .[id number] [facility]   





    Rx#:[id number]   


 


  Intake, IV Titration 473.207 582.532 109.306





  Amount   


 


    Heparin Sodium,Porcine/ 333.207 422.532 109.306





    D5w Pmx 25,000 unit In   





    Dextrose/Water 1 500ml.   





    bag @ 18 UNITS/KG/HR 42.   





    45 mls/hr IV .[id number] [facility]   





    Rx#:[id number]   


 


    Sodium Chloride 0.9% 1, 140 160 





    000 ml @ 20 mls/hr IV .   





    Q24H [facility] Rx#:[id number]   


 


  Oral 200  240


 


Output:   


 


  Urine   300


 


Other:   


 


  # Voids 0 2 1














- Exam





In general patient is alert and oriented 3 in no apparent distress


HEENT head normocephalic and atraumatic


Neck is supple no JVD no goiter


Chest exam reveals a scattered crackles bilaterally no wheezing


Cardiac exam reveals regular heart sounds S1 and S2 no gallops no murmurs


Abdomen is soft nontender no organomegaly


Extremity exam reveals minimal edema left more than right





- Labs


CBC & Chem 7: 


 02/10/17 13:14





 02/10/17 13:14


Labs: 


 Abnormal Lab Results - Last 24 Hours (Table)











  02/12/17 Range/Units





  05:40 


 


APTT  46.9 H  (22.0-30.0)  sec














Assessment and Plan


Plan: 





#1 bilateral pulmonary embolism, patient started on IV heparin, pulmonary 

consultation was requested


#2 slight elevation in troponin level, cardiology consultation was requested 

likely related to pulmonary embolism


#3 underlying history of hypertension


#4 underlying history of hyperlipidemia


#5 underlying history of hypothyroidism


#6 remote history of smoking quit 30 years ago


#7 underlying history of osteoarthritis


At this time continue was current medications, will assess if patient insurance 

pays for any of the new anticoagulation medications, if not patient will be 

started on Coumadin.

## 2017-02-12 NOTE — P.PN
Subjective


Principal diagnosis: 





Acute bilateral pulmonary emboli


This is a 62-year-old female with history of hypertension, degenerative joint 

disease and previous bilateral knee replacements years ago, history of 

hyperlipidemia, primarily a patient of Dr. Mae in [place].  Patient has 

been traveling by car for the last 2 weeks to [place] and back to 

[place].  And over the last 2 weeks she has been experiencing episodes of 

chest tightness, and shortness of breath with palpitations.  When patient came 

back to [place], she continued to have these symptoms especially with any 

exertion and more so when she was climbing any flight of stairs.  Patient had 

an episode of syncope as she was on the toilet.  Hence she was brought into the 

ER.  Patient was noted to have elevated d-dimer, chest x-ray was unremarkable, 

but CT of the chest showed bilateral pulmonary emboli.  Patient was started on 

heparin, she was hemodynamically stable, she had some troponin leak, and she 

did not require TPA.  Patient was admitted, and this consult was initiated.  No 

previous history of pulmonary embolism or DVT.  No previous history of 

hypercoagulable state.  No previous history of underlying malignancy.  And no 

family history of hypercoagulability.  No history of connective tissue disease.

  Presently patient is doing well, no cough no wheezing no shortness of breath 

no chest pain.  Patient has no shortness of breath at rest.  No headaches no 

blurred vision no dizziness.  No nausea no vomiting no abdominal pain no melena 

no hematemesis.  No dysuria and no frequency no urgency.





Patient was reevaluated today on 2/12/2017, doing quite well overall.  No cough 

no wheezing no shortness of breath, she feels a bit congested.  No shortness of 

breath and no chest pain.  Her PTT is therapeutic, patient remains on heparin.  

Patient is being considered for possible discharge in the next 24-48 hours on 

Xarelto if approved by her insurance.  Length of treatment will be anywhere 

between 3-6 months.











Objective





- Vital Signs


Vital signs: 


 Vital Signs











Temp  98.2 F   02/12/17 08:00


 


Pulse  90   02/12/17 09:10


 


Resp  18   02/12/17 08:00


 


BP  105/55   02/12/17 08:00


 


Pulse Ox  98   02/12/17 08:00








 Intake & Output











 02/11/17 02/12/17 02/12/17





 18:59 06:59 18:59


 


Intake Total 873.207 862.532 349.306


 


Output Total   300


 


Balance 873.207 862.532 49.306


 


Weight 117.934 kg 117 kg 


 


Intake:   


 


   280 


 


    Heparin Sodium,Porcine/ 200 280 





    D5w Pmx 25,000 unit In   





    Dextrose/Water 1 500ml.   





    bag @ 18 UNITS/KG/HR 42.   





    45 mls/hr IV .[id number] [facility]   





    Rx#:[id number]   


 


  Intake, IV Titration 473.207 582.532 109.306





  Amount   


 


    Heparin Sodium,Porcine/ 333.207 422.532 109.306





    D5w Pmx 25,000 unit In   





    Dextrose/Water 1 500ml.   





    bag @ 18 UNITS/KG/HR 42.   





    45 mls/hr IV .[id number] [facility]   





    Rx#:[id number]   


 


    Sodium Chloride 0.9% 1, 140 160 





    000 ml @ 20 mls/hr IV .   





    Q24H [facility] Rx#:[id number]   


 


  Oral 200  240


 


Output:   


 


  Urine   300


 


Other:   


 


  # Voids 0 2 1














- Exam





HEENT:[Neck is supple.] [No neck masses.] [No thyromegaly.] [No JVD.]


Chest: [Clear throughout, no crackles, no rhonchi, no wheezes.]


Cardiac Exam: [Normal S1 and S2, no S3 gallop, no murmur.]


Abdomen: [Soft, nontender,  no megaly, no rebound, no guarding, normal bowel 

sounds.]


Extremities: [No clubbing, slight edema noted in the left lower extremity above 

the left ankle and according to the patient that is chronic., no cyanosis.]


Neurological Exam: [No focal neurologic deficit.]








- Labs


CBC & Chem 7: 


 02/10/17 13:14





 02/10/17 13:14


Labs: 


 Abnormal Lab Results - Last 24 Hours (Table)











  02/12/17 Range/Units





  05:40 


 


APTT  46.9 H  (22.0-30.0)  sec














Assessment and Plan


Plan: 





Impression:





1 acute bilateral pulmonary embolism, provoked by recent long car travel.  And 

venous stasis.





2 acute deep vein thromboses involving the left lower extremity as noted on the 

venous Doppler





3 acute troponin leak most likely secondary to the volume load of the pulmonary 

emboli.





4 mild pulmonary hypertension as noted on the echocardiogram, no significant 

strain is appreciated.  On the echocardiogram.





3 history of multiple comorbidities including essential hypertension, 

hyperlipidemia, hypothyroidism, and history of degenerative joint disease.





Recommendation: Continue heparin, continue the rest of the medications as listed

, consider starting the patient in the next 24 hours on Xarelto and plan to 

discharge the patient home on Xarelto for at least the next 6 months.  I will 

see the patient on follow-up in the next few weeks.


Time with Patient: Less than 30

## 2017-02-13 RX ADMIN — LEVOTHYROXINE SODIUM SCH MCG: 75 TABLET ORAL at 06:00

## 2017-02-13 RX ADMIN — IPRATROPIUM BROMIDE AND ALBUTEROL SULFATE SCH: .5; 3 SOLUTION RESPIRATORY (INHALATION) at 15:11

## 2017-02-13 RX ADMIN — IPRATROPIUM BROMIDE AND ALBUTEROL SULFATE SCH: .5; 3 SOLUTION RESPIRATORY (INHALATION) at 11:39

## 2017-02-13 RX ADMIN — CEFAZOLIN SCH: 330 INJECTION, POWDER, FOR SOLUTION INTRAMUSCULAR; INTRAVENOUS at 16:29

## 2017-02-13 RX ADMIN — RIVAROXABAN SCH MG: 15 TABLET, FILM COATED ORAL at 10:22

## 2017-02-13 RX ADMIN — METOPROLOL TARTRATE SCH MG: 50 TABLET, FILM COATED ORAL at 08:20

## 2017-02-13 RX ADMIN — GUAIFENESIN PRN MG: 200 SOLUTION ORAL at 22:32

## 2017-02-13 RX ADMIN — ATORVASTATIN CALCIUM SCH MG: 20 TABLET, FILM COATED ORAL at 20:04

## 2017-02-13 RX ADMIN — IPRATROPIUM BROMIDE AND ALBUTEROL SULFATE SCH: .5; 3 SOLUTION RESPIRATORY (INHALATION) at 20:04

## 2017-02-13 RX ADMIN — RIVAROXABAN SCH MG: 15 TABLET, FILM COATED ORAL at 16:37

## 2017-02-13 RX ADMIN — IPRATROPIUM BROMIDE AND ALBUTEROL SULFATE SCH: .5; 3 SOLUTION RESPIRATORY (INHALATION) at 07:50

## 2017-02-13 RX ADMIN — GUAIFENESIN PRN MG: 200 SOLUTION ORAL at 16:37

## 2017-02-13 NOTE — P.PN
Subjective





Patient was complaining of shortness of breath this morning.  She said that her 

dyspnea started when she was laying in bed.  Her oxygen saturation was around 92

% on room air.  She requested to wear her oxygen and subsequently felt better


 





Objective





- Vital Signs


Vital signs: 


 Vital Signs











Temp  96.6 F L  02/13/17 11:48


 


Pulse  76   02/13/17 11:48


 


Resp  16   02/13/17 11:48


 


BP  116/88   02/13/17 11:48


 


Pulse Ox  98   02/13/17 11:48








 Intake & Output











 02/12/17 02/13/17 02/13/17





 18:59 06:59 18:59


 


Intake Total 1663.698 071.432 4823


 


Output Total 600 200 


 


Balance 1063.698 618.456 1227


 


Weight 117 kg 123 kg 


 


Intake:   


 


   420 126


 


    Heparin Sodium,Porcine/ 160 420 126





    D5w Pmx 25,000 unit In   





    Dextrose/Water 1 500ml.   





    bag @ 18 UNITS/KG/HR 42.   





    45 mls/hr IV .[id number] [facility]   





    Rx#:[id number]   


 


  Intake, IV Titration 533.698 462.119 60





  Amount   


 


    Heparin Sodium,Porcine/ 433.698 242.119 





    D5w Pmx 25,000 unit In   





    Dextrose/Water 1 500ml.   





    bag @ 18 UNITS/KG/HR 42.   





    45 mls/hr IV .[id number] [facility]   





    Rx#:[id number]   


 


    Sodium Chloride 0.9% 1, 100 220 60





    000 ml @ 20 mls/hr IV .   





    Q24H [facility] Rx#:[id number]   


 


  Oral 970 0 1060


 


Output:   


 


  Urine 600 200 


 


Other:   


 


  # Voids 3 1 














- Exam





General:  The patient is awake and alert, in no distress


Eye: there is normal conjunctiva bilaterally.  


Neck:  The neck is supple, there is no  JVD.  


Cardiovascular:  Normal  S1-S2, no S3-S4, no murmurs.


Respiratory: Lungs clear to auscultation bilaterally


Gastrointestinal: Abdomen is soft, nontender


Musculoskeletal:  There is no pedal edema.  


Neurological:. Speech is normal. 


Skin:  Skin is warm and dry 





- Labs


CBC & Chem 7: 


 02/10/17 13:14





 02/10/17 13:14


Labs: 


 Abnormal Lab Results - Last 24 Hours (Table)











  02/13/17 Range/Units





  06:19 


 


APTT  58.7 H  (22.0-30.0)  sec














Assessment and Plan


Plan: 





#1 bilateral pulmonary embolism, patient started on Rivaroxaban


#2 slight elevation in troponin level, most likely non-thrombotic troponin leak 

cardiology consultation was requested


#3 essential hypertension


#4 underlying history of hyperlipidemia


#5 underlying history of hypothyroidism


#6 remote history of smoking quit 30 years ago


#7 underlying history of osteoarthritis





Plan for today: Patient was reassured.  Wean off oxygen and check O2 sat ration 

at rest and with ambulation on room air.  Plan to discharge home tomorrow.  

Start Robitussin as needed for cough.

## 2017-02-13 NOTE — P.PN
Subjective





Progress note dated 02/13/2017





This a 61-year-old female sustained a bilateral pulmonary emboli.  She had a 

recent long car ride from [place].  She did stop periodically long away 

but did develop bilateral pulmonary emboli.  Clot burden seemed pretty 

significant.  She's feeling well though.  She did have a clot in the back of 

her left leg behind the knee.





We talked about length of treatment.  The patient is doing well.  Probably only 

be treated about 3 months.  You could argue for 6 months only because of the 

amount of clot burden.  I think a repeat computed tomography scan will help 

with quite a bit.  We'll plan to do 110 weeks after the first one.  At which 

time, we'll decide whether or not she needs longer course of treatment.





Again she's feeling well.  No complaints at this time.











Objective





- Vital Signs


Vital signs: 


 Vital Signs











Temp  96.6 F L  02/13/17 11:48


 


Pulse  76   02/13/17 11:48


 


Resp  16   02/13/17 11:48


 


BP  116/88   02/13/17 11:48


 


Pulse Ox  98   02/13/17 11:48








 Intake & Output











 02/12/17 02/13/17 02/13/17





 18:59 06:59 18:59


 


Intake Total 1663.698 310.419 2223


 


Output Total 600 200 


 


Balance 1063.698 648.402 0007


 


Weight 117 kg 123 kg 


 


Intake:   


 


   420 126


 


    Heparin Sodium,Porcine/ 160 420 126





    D5w Pmx 25,000 unit In   





    Dextrose/Water 1 500ml.   





    bag @ 18 UNITS/KG/HR 42.   





    45 mls/hr IV .[id number] [facility]   





    Rx#:[id number]   


 


  Intake, IV Titration 533.698 462.119 60





  Amount   


 


    Heparin Sodium,Porcine/ 433.698 242.119 





    D5w Pmx 25,000 unit In   





    Dextrose/Water 1 500ml.   





    bag @ 18 UNITS/KG/HR 42.   





    45 mls/hr IV .[id number] [facility]   





    Rx#:[id number]   


 


    Sodium Chloride 0.9% 1, 100 220 60





    000 ml @ 20 mls/hr IV .   





    Q24H [facility] Rx#:[id number]   


 


  Oral 970 0 1060


 


Output:   


 


  Urine 600 200 


 


Other:   


 


  # Voids 3 1 














- Exam





No acute distress, oriented 3.





HEENT examination is grossly unremarkable.  Mucous membranes are moist.





Supple.  Full range of motion.  No adenopathy or thyromegaly.





Cardio vascular examination was regular rhythm rate.  S1-S2 normal.  No murmur.





Lungs reveal relatively clear breath sounds.  No wheezes or rhonchi.  No 

crackles.





Abdomen soft bowel sounds are heard.





Extremities are intact.





- Labs


CBC & Chem 7: 


 02/10/17 13:14





 02/10/17 13:14


Labs: 


 Abnormal Lab Results - Last 24 Hours (Table)











  02/13/17 Range/Units





  06:19 


 


APTT  58.7 H  (22.0-30.0)  sec














Assessment and Plan


(1) DVT (deep venous thrombosis)


Status: Acute   





(2) Dyspnea on exertion


Status: Acute   





(3) Pulmonary embolism


Status: Acute   


Plan: 





Plan dated 02/13/2017





The patient's just finishing off her IV heparin.  This was started on Zarrella 

toe.  She should be treated for at least 3 months.  He may argue for a longer 

period to treatment.  This will depend on the follow-up computed tomography 

scan and also I'll see whether or not she had a N-terminal proBNP done and 

whether or not she had troponins done.  In addition, did she have a cardiac 

echo done.  This may lead some credence to a longer period of  treatment if she 

in fact had evidence of right ventricular strain.





The patient should follow up with us in the office in about 3 weeks.  Follow-up 

computed tomography scan in about 10 weeks after the initial computed 

tomography scan.  We'll discuss treatment with the patient at that time.  

Additional recommendations suggestions are forthcoming.  Follow-up with deftly 

necessary.


Time with Patient: Less than 30

## 2017-02-13 NOTE — P.PN
Subjective


Principal diagnosis: 





BiLateral pulmonary embolism


This is a 61-year-old  female with history of hypertension, 

hyperlipidemia, degenerative joint disease, who had been traveling by car for 

the last 2 weeks to [place] and back to [place].  Over the past 2 weeks

, she states that she had been experiencing episodes of chest discomfort and 

shortness of breath with occasional palpitations.  She presented to the 

hospital following an episode of syncope.  She was noted to have an elevated d-

dimer, chest x-ray was unremarkable, but the CAT scan of the chest did reveal 

bilateral pulmonary embolisms.  She was initiated on IV heparin.  

Echocardiogram with Doppler study was performed which revealed an ejection 

fraction of 60-65%.  Blood pressure 116/88 with a heart rate in the 60s.  

Patient was seen and examined this morning, she states earlier in the morning 

she had an episode where she felt short of breath, oxygen supply.  Stable at 

the time of my examination.  Also states she had one episode of palpitations, 

telemetry monitor at that time showed a normal sinus rhythm.








Objective





- Vital Signs


Vital signs: 


 Vital Signs











Temp  96.6 F L  02/13/17 11:48


 


Pulse  76   02/13/17 11:48


 


Resp  16   02/13/17 11:48


 


BP  116/88   02/13/17 11:48


 


Pulse Ox  98   02/13/17 11:48








 Intake & Output











 02/12/17 02/13/17 02/13/17





 18:59 06:59 18:59


 


Intake Total 1663.698 826.669 1794


 


Output Total 600 200 


 


Balance 1063.698 476.411 1490


 


Weight 117 kg 123 kg 


 


Intake:   


 


   420 126


 


    Heparin Sodium,Porcine/ 160 420 126





    D5w Pmx 25,000 unit In   





    Dextrose/Water 1 500ml.   





    bag @ 18 UNITS/KG/HR 42.   





    45 mls/hr IV .[id number] ANDREW   





    Rx#:[id number]   


 


  Intake, IV Titration 533.698 462.119 60





  Amount   


 


    Heparin Sodium,Porcine/ 433.698 242.119 





    D5w Pmx 25,000 unit In   





    Dextrose/Water 1 500ml.   





    bag @ 18 UNITS/KG/HR 42.   





    45 mls/hr IV .[id number] NADREW   





    Rx#:[id number]   


 


    Sodium Chloride 0.9% 1, 100 220 60





    000 ml @ 20 mls/hr IV .   





    Q24H ANDREW Rx#:[id number]   


 


  Oral 970 0 1060


 


Output:   


 


  Urine 600 200 


 


Other:   


 


  # Voids 3 1 














- Exam





PHYSICAL EXAMINATION: 





HEENT: [Head is atraumatic, normocephalic.  Pupils equal, round.  Neck is 

supple.  There is no elevated jugular venous pressure.]





HEART EXAMINATION: [Heart S1, S2 normal.  No murmur or gallop heard.]





CHEST EXAMINATION:[ Lungs are clear to auscultation and precussion. No chest 

wall tenderness is noted on palpation or with deep breathing.]





ABDOMEN: [ Soft, nontender. Bowel sounds are heard. No organomegaly noted].


 


EXTREMITIES:[ 2+ peripheral pulses with no evidence of peripheral edema and no 

calf tenderness noted].





NEUROLOGIC [patient is awake, alert and oriented -3.]


 


.


 








- Labs


CBC & Chem 7: 


 02/10/17 13:14





 02/10/17 13:14


Labs: 


 Abnormal Lab Results - Last 24 Hours (Table)











  02/13/17 Range/Units





  06:19 


 


APTT  58.7 H  (22.0-30.0)  sec














Assessment and Plan


(1) Bilateral pulmonary embolism


Status: Acute   





(2) HTN (hypertension)


Status: Acute   





(3) Hyperlipemia


Status: Acute   





(4) DVT (deep venous thrombosis)


Status: Acute   


Plan: 


Cardiology's perspective, we'll discontinue the IV heparin and start the 

patient on xarelto 15 mg one tablet by mouth twice a day for 21 days, then 20 

mg daily.  Continue to monitor for another 24-48 hours.








DNP note has been reviewed, I agree with a documented findings and plan of 

care.  Patient was seen and examined.

## 2017-02-14 VITALS — RESPIRATION RATE: 18 BRPM

## 2017-02-14 VITALS — DIASTOLIC BLOOD PRESSURE: 67 MMHG | SYSTOLIC BLOOD PRESSURE: 121 MMHG | TEMPERATURE: 97.1 F | HEART RATE: 59 BPM

## 2017-02-14 LAB
ANION GAP SERPL CALC-SCNC: 9 MMOL/L
BASOPHILS # BLD AUTO: 0.1 K/UL (ref 0–0.2)
BASOPHILS NFR BLD AUTO: 1 %
BUN SERPL-SCNC: 13 MG/DL (ref 7–17)
CALCIUM SPEC-MCNC: 9.4 MG/DL (ref 8.4–10.2)
CH: 31.1
CHCM: 32.8
CHLORIDE SERPL-SCNC: 108 MMOL/L (ref 98–107)
CO2 SERPL-SCNC: 27 MMOL/L (ref 22–30)
EOSINOPHIL # BLD AUTO: 0.3 K/UL (ref 0–0.7)
EOSINOPHIL NFR BLD AUTO: 5 %
ERYTHROCYTE [DISTWIDTH] IN BLOOD BY AUTOMATED COUNT: 4.33 M/UL (ref 3.8–5.4)
ERYTHROCYTE [DISTWIDTH] IN BLOOD: 13 % (ref 11.5–15.5)
GLUCOSE SERPL-MCNC: 91 MG/DL (ref 74–99)
HCT VFR BLD AUTO: 41.3 % (ref 34–46)
HDW: 2.34
HGB BLD-MCNC: 13 GM/DL (ref 11.4–16)
LUC NFR BLD AUTO: 2 %
LYMPHOCYTES # SPEC AUTO: 1.5 K/UL (ref 1–4.8)
LYMPHOCYTES NFR SPEC AUTO: 28 %
MCH RBC QN AUTO: 30 PG (ref 25–35)
MCHC RBC AUTO-ENTMCNC: 31.4 G/DL (ref 31–37)
MCV RBC AUTO: 95.4 FL (ref 80–100)
MONOCYTES # BLD AUTO: 0.4 K/UL (ref 0–1)
MONOCYTES NFR BLD AUTO: 7 %
NEUTROPHILS # BLD AUTO: 3 K/UL (ref 1.3–7.7)
NEUTROPHILS NFR BLD AUTO: 56 %
NON-AFRICAN AMERICAN GFR(MDRD): 59
POTASSIUM SERPL-SCNC: 4.7 MMOL/L (ref 3.5–5.1)
SODIUM SERPL-SCNC: 144 MMOL/L (ref 137–145)
WBC # BLD AUTO: 0.12 10*3/UL
WBC # BLD AUTO: 5.3 K/UL (ref 3.8–10.6)
WBC (PEROX): 5.54

## 2017-02-14 RX ADMIN — IPRATROPIUM BROMIDE AND ALBUTEROL SULFATE SCH: .5; 3 SOLUTION RESPIRATORY (INHALATION) at 07:25

## 2017-02-14 RX ADMIN — METOPROLOL TARTRATE SCH MG: 50 TABLET, FILM COATED ORAL at 08:40

## 2017-02-14 RX ADMIN — CEFAZOLIN SCH: 330 INJECTION, POWDER, FOR SOLUTION INTRAMUSCULAR; INTRAVENOUS at 14:55

## 2017-02-14 RX ADMIN — IPRATROPIUM BROMIDE AND ALBUTEROL SULFATE SCH: .5; 3 SOLUTION RESPIRATORY (INHALATION) at 13:30

## 2017-02-14 RX ADMIN — GUAIFENESIN PRN MG: 200 SOLUTION ORAL at 06:27

## 2017-02-14 RX ADMIN — RIVAROXABAN SCH MG: 15 TABLET, FILM COATED ORAL at 06:26

## 2017-02-14 RX ADMIN — LEVOTHYROXINE SODIUM SCH MCG: 75 TABLET ORAL at 06:26

## 2017-02-14 NOTE — P.PN
Subjective


Principal diagnosis: 





BiLateral pulmonary embolism


This is a 61-year-old  female with history of hypertension, 

hyperlipidemia, degenerative joint disease, who had been traveling by car for 

the last 2 weeks to [place] and back to [place].  Over the past 2 weeks

, she states that she had been experiencing episodes of chest discomfort and 

shortness of breath with occasional palpitations.  She presented to the 

hospital following an episode of syncope.  She was noted to have an elevated d-

dimer, chest x-ray was unremarkable, but the CAT scan of the chest did reveal 

bilateral pulmonary embolisms.  Echocardiogram with Doppler study was performed 

which revealed an ejection fraction of 60-65%.  Currently on Xarelto for PE 

protocol.





Objective





- Vital Signs


Vital signs: 


 Vital Signs











Temp  97.1 F L  02/14/17 12:05


 


Pulse  59 L  02/14/17 12:05


 


Resp  18   02/14/17 12:05


 


BP  121/67   02/14/17 12:05


 


Pulse Ox  95   02/14/17 12:05








 Intake & Output











 02/13/17 02/14/17 02/14/17





 18:59 06:59 18:59


 


Intake Total 2446 20 240


 


Balance 2446 20 240


 


Weight  122.8 kg 


 


Intake:   


 


   20 


 


    10 mL sodium chloride  20 





    FLUSH   


 


    Heparin Sodium,Porcine/ 126  





    D5w Pmx 25,000 unit In   





    Dextrose/Water 1 500ml.   





    bag @ 18 UNITS/KG/HR 42.   





    45 mls/hr IV .[id number] ANDREW   





    Rx#:[id number]   


 


  Intake, IV Titration 60  





  Amount   


 


    Sodium Chloride 0.9% 1, 60  





    000 ml @ 20 mls/hr IV .   





    Q24H ANDREW Rx#:[id number]   


 


  Oral 2260  240


 


Other:   


 


  Voiding Method Toilet  Toilet














- Exam





PHYSICAL EXAMINATION: 





HEENT: [Head is atraumatic, normocephalic.  Pupils equal, round.  Neck is 

supple.  There is no elevated jugular venous pressure.]





HEART EXAMINATION: [Heart S1, S2 normal.  No murmur or gallop heard.]





CHEST EXAMINATION:[ Lungs are clear to auscultation and precussion. No chest 

wall tenderness is noted on palpation or with deep breathing.]





ABDOMEN: [ Soft, nontender. Bowel sounds are heard. No organomegaly noted].


 


EXTREMITIES:[ 2+ peripheral pulses with no evidence of peripheral edema and no 

calf tenderness noted].





NEUROLOGIC [patient is awake, alert and oriented -3.]


 


.


 








- Labs


CBC & Chem 7: 


 02/14/17 06:25





 02/14/17 06:25


Labs: 


 Abnormal Lab Results - Last 24 Hours (Table)











  02/14/17 Range/Units





  06:25 


 


Chloride  108 H  ()  mmol/L














Assessment and Plan


(1) Bilateral pulmonary embolism


Status: Acute   





(2) HTN (hypertension)


Status: Acute   





(3) Hyperlipemia


Status: Acute   





(4) DVT (deep venous thrombosis)


Status: Acute   


Plan: 


Cardiology's perspective, the patient may be able to be discharged home once 

cleared by the primary.  We will continue xarelto 15 mg one tablet by mouth 

twice a day for a duration of 21 days, then start the patient on xarelto 20 mg 

daily.  A follow-up appointment will be made with Dr. Colón in the office post 

discharge.





DNP note has been reviewed, I agree with a documented findings and plan of 

care.  Patient was seen and examined.

## 2017-02-14 NOTE — P.PN
Subjective





Progress note dated 02/13/2017





This a 61-year-old female sustained a bilateral pulmonary emboli.  She had a 

recent long car ride from [place].  She did stop periodically long away 

but did develop bilateral pulmonary emboli.  Clot burden seemed pretty 

significant.  She's feeling well though.  She did have a clot in the back of 

her left leg behind the knee.





We talked about length of treatment.  The patient is doing well.  Probably only 

be treated about 3 months.  You could argue for 6 months only because of the 

amount of clot burden.  I think a repeat computed tomography scan will help 

with quite a bit.  We'll plan to do 110 weeks after the first one.  At which 

time, we'll decide whether or not she needs longer course of treatment.





Again she's feeling well.  No complaints at this time.





Progress note dated 02/14/2017





This pleasant 61-year-old female with a diagnosis of DVT and pulmonary embolism 

is being discharged home.  Her risk factor was a long car ride from [place] 

[place]..  She is doing better.  Although on her computed tomography scan, her 

clot burden seems significant, he really had no major findings which would 

suggest a submassive PE.  Her N-terminal proBNP was nearly normal.  Her and her 

d-dimer was elevated.  Her BNP was normal or just slightly elevated.  There is 

no evidence of significant right heart strain flattening of the septum leftward 

movement of the septum or acute tricuspid regurgitation on echocardiogram and a 

computed tomography scan did not show any evidence of right ventricular strain.








Objective





- Vital Signs


Vital signs: 


 Vital Signs











Temp  97.1 F L  02/14/17 12:05


 


Pulse  59 L  02/14/17 12:05


 


Resp  18   02/14/17 12:05


 


BP  121/67   02/14/17 12:05


 


Pulse Ox  95   02/14/17 12:05








 Intake & Output











 02/13/17 02/14/17 02/14/17





 18:59 06:59 18:59


 


Intake Total 2446 20 480


 


Balance 2446 20 480


 


Weight  122.8 kg 


 


Intake:   


 


   20 


 


    10 mL sodium chloride  20 





    FLUSH   


 


    Heparin Sodium,Porcine/ 126  





    D5w Pmx 25,000 unit In   





    Dextrose/Water 1 500ml.   





    bag @ 18 UNITS/KG/HR 42.   





    45 mls/hr IV .[id number] [facility]   





    Rx#:[id number]   


 


  Intake, IV Titration 60  





  Amount   


 


    Sodium Chloride 0.9% 1, 60  





    000 ml @ 20 mls/hr IV .   





    Q24H [facility] Rx#:[id number]   


 


  Oral 2260  480


 


Other:   


 


  Voiding Method Toilet  Toilet














- Exam





No acute distress, oriented 3.





HEENT examination is grossly unremarkable.  Mucous membranes are moist.





Supple.  Full range of motion.  No adenopathy or thyromegaly.





Cardio vascular examination was regular rhythm rate.  S1-S2 normal.  No murmur.





Lungs reveal relatively clear breath sounds.  No wheezes or rhonchi.  No 

crackles.





Abdomen soft bowel sounds are heard.





Extremities are intact.





- Labs


CBC & Chem 7: 


 02/14/17 06:25





 02/14/17 06:25


Labs: 


 Abnormal Lab Results - Last 24 Hours (Table)











  02/14/17 Range/Units





  06:25 


 


Chloride  108 H  ()  mmol/L














Assessment and Plan


(1) DVT (deep venous thrombosis)


Status: Acute   





(2) Dyspnea on exertion


Status: Acute   





(3) Pulmonary embolism


Status: Acute   


Plan: 





Plan dated 02/13/2017





The patient's just finishing off her IV heparin.  This was started on Zarrella 

toe.  She should be treated for at least 3 months.  He may argue for a longer 

period to treatment.  This will depend on the follow-up computed tomography 

scan and also I'll see whether or not she had a N-terminal proBNP done and 

whether or not she had troponins done.  In addition, did she have a cardiac 

echo done.  This may lead some credence to a longer period of  treatment if she 

in fact had evidence of right ventricular strain.





The patient should follow up with us in the office in about 3 weeks.  Follow-up 

computed tomography scan in about 10 weeks after the initial computed 

tomography scan.  We'll discuss treatment with the patient at that time.  

Additional recommendations suggestions are forthcoming.  Follow-up with deftly 

necessary.





Plan dated 02/14/2017





The patient is doing well.  Will likely be discharged home today.  She'll 

follow me in the office.  I told her that we repeat a CT angiogram in about 8-

10 weeks or so.  I gave her one of my cards.  Additional recommendations 

suggestions are forthcoming.  Depending on her response to therapy and what her 

CAT scan was show, we'll decide on length of treatment.  Technically, because 

she has a provoking factor, a long car ride from [place], she should 

only be treated for 3 months.  Speaking against that was affected she has 

significant clot burden on CT angiogram although she had no evidence of right 

heart strain and nothing to suggest submassive PE.


Time with Patient: Less than 30

## 2017-02-14 NOTE — P.DS
Providers


Date of admission: 


02/10/17 15:44





Attending physician: 


Erich Baez





Primary care physician: 


Elisha Mae





[facility] Course: 





This is a 61-year-old female with past medical history noted below who 

presented to the hospital initially with chest discomfort and shortness of 

breath.  Patient was evaluated in the emergency room and was noted to have an 

elevated d-dimer.  CT angiogram revealed bilateral pulmonary embolus. Patient 

was started on IV heparin and was admitted to telemetry floor.  She was seen 

and evaluated by cardiology and pulmonology.  Echocardiogram showed preserved 

ejection fraction with no significant valvular abnormality and the right 

ventricle was noted to be mildly enlarged. Patient's anticoagulation was 

switched to Rivaroxaban and was checked by social work to be covered by 

insurance.  





She will continue Rivaroxaban 15 mg twice daily for 20 days at home then 

switched to 20 mg at bedtime thereafter.  She needs to be on anticoagulation 

for at least 6 months. Below is a list of her medical problems addressed during 

this hospitalization.  








#1 bilateral pulmonary embolism, patient started on Rivaroxaban


#2 slight elevation in troponin level, most likely non-thrombotic troponin leak 

cardiology consultation was requested


#3 essential hypertension


#4 underlying history of hyperlipidemia


#5 underlying history of hypothyroidism


#6 remote history of smoking quit 30 years ago


#7 underlying history of osteoarthritis


Patient Condition at Discharge: Stable





Plan - Discharge Summary


New Discharge Prescriptions: 


Metoprolol Tartrate 25 mg PO BID #60 tab


Rivaroxaban [Xarelto] 15 mg PO BID #40 tab


Rivaroxaban [Xarelto] 20 mg PO HS #30 tab


Discharge Medication List





Atorvastatin [Lipitor] 20 mg PO HS 02/10/17 [History]


Levothyroxine Sodium [Synthroid] 150 mcg PO DAILY 02/10/17 [History]


Metoprolol Tartrate 25 mg PO BID #60 tab 02/14/17 [Rx]


Rivaroxaban [Xarelto] 15 mg PO BID #40 tab 02/14/17 [Rx]


Rivaroxaban [Xarelto] 20 mg PO HS #30 tab 02/14/17 [Rx]








Follow up Appointment(s)/Referral(s): 


Elisha Mae MD [Primary Care Provider] - 1-2 days


Ervin Colón MD [STAFF PHYSICIAN] - 2 Weeks


Discharge Disposition: HOME SELF-CARE

## 2017-04-20 ENCOUNTER — HOSPITAL ENCOUNTER (OUTPATIENT)
Dept: HOSPITAL 47 - RADCTMAIN | Age: 62
Discharge: HOME | End: 2017-04-20
Payer: COMMERCIAL

## 2017-04-20 DIAGNOSIS — K44.9: ICD-10-CM

## 2017-04-20 DIAGNOSIS — I26.99: Primary | ICD-10-CM

## 2017-04-20 PROCEDURE — 71275 CT ANGIOGRAPHY CHEST: CPT

## 2017-04-20 NOTE — CT
EXAMINATION TYPE: CT angio chest

 

DATE OF EXAM: 4/20/2017 10:55 AM

 

COMPARISON: 2/10/2017

 

HISTORY: 61-year-old female, follow-up PE 2/10/17

 

TECHNIQUE: Contiguous axial scanning of the rest performed with IV Contrast, patient injected with 10
0 mL of Omnipaque 350. Coronal/sagittal MIP reconstructions performed.

 

CT DLP: 663.0 mGycm

Automated exposure control for dose reduction was used.

 

FINDINGS: 

The heart is borderline enlarged without pericardial effusion. No reflux of contrast into the IVC or 
flattening of the interventricular septum.

 

Minimal coronary vessel calcifications are present.

 

Aorta is normal caliber with conventional arch vessel branching anatomy.

 

No thoracic lymphadenopathy by CT size criteria.

 

Satisfactory opacification of the pulmonary arterial system. There is borderline to mildly enlarged c
aliber to the main right and left pulmonary arteries at 2.6 cm each..

 

There has been interval clearance of the extensive distal main pulmonary emboli. Some nonocclusive mu
ral based embolic material is seen within a right lobar pulmonary arterial branch, axial image 56. Ad
ditional branch point embolic material within the descending left pulmonary artery extending down int
o a proximal segmental lower lobe branch where a web is noted, axial image 56 through 68. No other re
sidual embolic material is seen.

 

Evaluation of the lung shows no consolidation or pleural effusion.

 

There is a small hiatal hernia.

 

Visualized upper abdomen shows cholecystectomy clips. Adrenal glands are clear.

 

Bones: Endplate spondylosis mid to lower thoracic spine.

 

 

IMPRESSION: 

 

1. CLEARANCE OF THE MAJORITY OF THE PREVIOUSLY SEEN PULMONARY EMBOLI. SMALL AMOUNT OF RESIDUAL CHRONI
C NONOCCLUSIVE THROMBUS IS PRESENT IN A RIGHT LOBAR BRANCH AND ADDITIONAL RESIDUAL EMBOLIC MATERIAL A
ND SOME CHRONIC WEB FORMATION IS PRESENT IN THE LEFT DESCENDING PULMONARY ARTERY EXTENDING TO A PROXI
MAL SEGMENTAL LEFT LOWER LOBE BRANCH.

2. CORRELATE FOR UNDERLYING PULMONARY ARTERIAL HYPERTENSION.

3. SMALL HIATAL HERNIA.

## 2017-05-08 ENCOUNTER — HOSPITAL ENCOUNTER (EMERGENCY)
Dept: HOSPITAL 47 - EC | Age: 62
Discharge: HOME | End: 2017-05-08
Payer: COMMERCIAL

## 2017-05-08 VITALS — TEMPERATURE: 100.9 F | RESPIRATION RATE: 20 BRPM

## 2017-05-08 VITALS — SYSTOLIC BLOOD PRESSURE: 104 MMHG | HEART RATE: 88 BPM | DIASTOLIC BLOOD PRESSURE: 55 MMHG

## 2017-05-08 DIAGNOSIS — E78.5: ICD-10-CM

## 2017-05-08 DIAGNOSIS — E07.9: ICD-10-CM

## 2017-05-08 DIAGNOSIS — Z79.899: ICD-10-CM

## 2017-05-08 DIAGNOSIS — R50.9: ICD-10-CM

## 2017-05-08 DIAGNOSIS — J32.9: ICD-10-CM

## 2017-05-08 DIAGNOSIS — Z86.711: ICD-10-CM

## 2017-05-08 DIAGNOSIS — J98.01: Primary | ICD-10-CM

## 2017-05-08 DIAGNOSIS — Z87.891: ICD-10-CM

## 2017-05-08 DIAGNOSIS — Z88.0: ICD-10-CM

## 2017-05-08 LAB
ALP SERPL-CCNC: 63 U/L (ref 38–126)
ALT SERPL-CCNC: 30 U/L (ref 9–52)
ANION GAP SERPL CALC-SCNC: 9 MMOL/L
APTT BLD: 25 SEC (ref 22–30)
AST SERPL-CCNC: 25 U/L (ref 14–36)
BASOPHILS # BLD AUTO: 0 K/UL (ref 0–0.2)
BASOPHILS NFR BLD AUTO: 0 %
BUN SERPL-SCNC: 11 MG/DL (ref 7–17)
CALCIUM SPEC-MCNC: 9 MG/DL (ref 8.4–10.2)
CH: 31.2
CHCM: 33.6
CHLORIDE SERPL-SCNC: 105 MMOL/L (ref 98–107)
CK SERPL-CCNC: 53 U/L (ref 30–135)
CO2 SERPL-SCNC: 24 MMOL/L (ref 22–30)
EOSINOPHIL # BLD AUTO: 0.1 K/UL (ref 0–0.7)
EOSINOPHIL NFR BLD AUTO: 1 %
ERYTHROCYTE [DISTWIDTH] IN BLOOD BY AUTOMATED COUNT: 4.28 M/UL (ref 3.8–5.4)
ERYTHROCYTE [DISTWIDTH] IN BLOOD: 13 % (ref 11.5–15.5)
GLUCOSE SERPL-MCNC: 113 MG/DL (ref 74–99)
HCT VFR BLD AUTO: 39.9 % (ref 34–46)
HDW: 2.38
HGB BLD-MCNC: 13.4 GM/DL (ref 11.4–16)
INR PPP: 1.1 (ref ?–1.1)
LUC NFR BLD AUTO: 2 %
LYMPHOCYTES # SPEC AUTO: 1.3 K/UL (ref 1–4.8)
LYMPHOCYTES NFR SPEC AUTO: 12 %
MAGNESIUM SPEC-SCNC: 1.8 MG/DL (ref 1.6–2.3)
MCH RBC QN AUTO: 31.4 PG (ref 25–35)
MCHC RBC AUTO-ENTMCNC: 33.7 G/DL (ref 31–37)
MCV RBC AUTO: 93.2 FL (ref 80–100)
MONOCYTES # BLD AUTO: 0.6 K/UL (ref 0–1)
MONOCYTES NFR BLD AUTO: 5 %
NEUTROPHILS # BLD AUTO: 8.9 K/UL (ref 1.3–7.7)
NEUTROPHILS NFR BLD AUTO: 80 %
NON-AFRICAN AMERICAN GFR(MDRD): >60
POTASSIUM SERPL-SCNC: 4.1 MMOL/L (ref 3.5–5.1)
PROT SERPL-MCNC: 6.6 G/DL (ref 6.3–8.2)
PT BLD: 11.1 SEC (ref 9–12)
SODIUM SERPL-SCNC: 138 MMOL/L (ref 137–145)
TROPONIN I SERPL-MCNC: <0.012 NG/ML (ref 0–0.03)
WBC # BLD AUTO: 0.23 10*3/UL
WBC # BLD AUTO: 11.2 K/UL (ref 3.8–10.6)
WBC (PEROX): 12.04

## 2017-05-08 PROCEDURE — 87502 INFLUENZA DNA AMP PROBE: CPT

## 2017-05-08 PROCEDURE — 85730 THROMBOPLASTIN TIME PARTIAL: CPT

## 2017-05-08 PROCEDURE — 87040 BLOOD CULTURE FOR BACTERIA: CPT

## 2017-05-08 PROCEDURE — 71020: CPT

## 2017-05-08 PROCEDURE — 99284 EMERGENCY DEPT VISIT MOD MDM: CPT

## 2017-05-08 PROCEDURE — 93005 ELECTROCARDIOGRAM TRACING: CPT

## 2017-05-08 PROCEDURE — 85610 PROTHROMBIN TIME: CPT

## 2017-05-08 PROCEDURE — 85025 COMPLETE CBC W/AUTO DIFF WBC: CPT

## 2017-05-08 PROCEDURE — 83605 ASSAY OF LACTIC ACID: CPT

## 2017-05-08 PROCEDURE — 82550 ASSAY OF CK (CPK): CPT

## 2017-05-08 PROCEDURE — 83735 ASSAY OF MAGNESIUM: CPT

## 2017-05-08 PROCEDURE — 36415 COLL VENOUS BLD VENIPUNCTURE: CPT

## 2017-05-08 PROCEDURE — 96360 HYDRATION IV INFUSION INIT: CPT

## 2017-05-08 PROCEDURE — 84484 ASSAY OF TROPONIN QUANT: CPT

## 2017-05-08 PROCEDURE — 82553 CREATINE MB FRACTION: CPT

## 2017-05-08 PROCEDURE — 83880 ASSAY OF NATRIURETIC PEPTIDE: CPT

## 2017-05-08 PROCEDURE — 94640 AIRWAY INHALATION TREATMENT: CPT

## 2017-05-08 PROCEDURE — 80053 COMPREHEN METABOLIC PANEL: CPT

## 2017-05-08 NOTE — ED
Fever HPI





- General


Chief Complaint: Fever


Stated Complaint: Fever/102.2


Time Seen by Provider: 17 20:35


Source: patient, RN notes reviewed, old records reviewed


Mode of arrival: wheelchair


Limitations: no limitations





- History of Present Illness


Initial Comments: 





This is a 61-year-old female with a recent hospitalization in February for 

pulmonary emboli who states she had the onset fevers chills sweats some 

shortness of breath that was refractory to her home updrafts and she started 

coughing up a little bit of blood today.  She currently is on Xarelto for her 

pulmonary and wasn't.  She denies any chest pain she also has had rhinorrhea 

chest congestion and sinus congestion.  She also states her ears feel a bit 

fall.


MD Complaint: fever, other





- Related Data


 Home Medications











 Medication  Instructions  Recorded  Confirmed


 


Atorvastatin [Lipitor] 20 mg PO HS 02/10/17 05/08/17


 


Levothyroxine Sodium [Synthroid] 150 mcg PO DAILY 02/10/17 05/08/17


 


D-Methorphan/PE/Acetaminophen 2 cap PO DAILY PRN 17





[Vicks Dayquil Liquicaps]   


 


Rivaroxaban [Xarelto] 20 mg PO DAILY 17








 Previous Rx's











 Medication  Instructions  Recorded


 


Metoprolol Tartrate 25 mg PO BID #60 tab 17


 


Ipratropium-Albuterol Nebulize 3 ml INHALATION Q6HR PRN #120 neb 17





[Duoneb 0.5 mg-3 mg/3 ml Soln]  


 


Ipratropium/Albuterol Sulfate 2 puff INHALATION QID #1 inhaler 17





[Combivent Respimat Inhaler]  


 


Levofloxacin [Levaquin] 500 mg PO DAILY #9 tab 17


 


predniSONE 20 mg PO BID #10 tab 17











 Allergies











Allergy/AdvReac Type Severity Reaction Status Date / Time


 


Penicillins Allergy  Swelling Verified 17 20:36














Review of Systems


ROS Statement: 


Those systems with pertinent positive or pertinent negative responses have been 

documented in the HPI.





ROS Other: All systems not noted in ROS Statement are negative.





Past Medical History


Past Medical History: Chest Pain / Angina, Hyperlipidemia, Osteoarthritis (OA), 

Pneumonia, Thyroid Disorder


Additional Past Medical History / Comment(s): PAPITATIONS, PE


History of Any Multi-Drug Resistant Organisms: None Reported


Past Surgical History:  Section, Cholecystectomy, Hernia Repair, 

Orthopedic Surgery, Tonsillectomy


Additional Past Surgical History / Comment(s): ONE DENTAL IMPLANT, COLONOSCOPY,

HEMORRHOIDECTOMY,PILONIDAL CYST REMOVED AGE 17, CERVICAL BX-PRE CANCEROUS CELLS-

HAD PROCEDURE , HAD BROKEN IUD REMOVED AND PT STATED THEY WENT IN LIKE HAVING A 

 TO REMOVED BROKEN PEICES,DONNA KNEE REPLACEMENTS


Past Anesthesia/Blood Transfusion Reactions: No Reported Reaction


Past Psychological History: No Psychological Hx Reported


Smoking Status: Former smoker


Past Alcohol Use History: Occasional


Additional Past Alcohol Use History / Comment(s): STARTED SMOKING AT AGE 17, 

SMOKED 1PPD ,QUIT 


Past Drug Use History: None Reported





- Past Family History


  ** Father


Family Medical History: Congestive Heart Failure (CHF)


Additional Family Medical History / Comment(s):  AGE 78





  ** Mother


Additional Family Medical History / Comment(s): PARKINSONS- AGE 74.     AN 

AUNT HAD BRAIN AND ABD ANUERYSM.





General Exam





- General Exam Comments


Initial Comments: 





This is a well-developed well-nourished awake alert oriented 3 female


Limitations: no limitations


General appearance: alert, anxious


Head exam: Present: atraumatic, normocephalic, normal inspection


Eye exam: Present: normal appearance, PERRL, EOMI.  Absent: scleral icterus, 

conjunctival injection, periorbital swelling


ENT exam: Present: other (Boggy nasal mucosa dull tympanic membranes 

bilaterally no pharyngeal exudate or erythema seen.  Some discomfort to 

percussion over the sinuses.)


Neck exam: Present: normal inspection, full ROM.  Absent: tenderness, 

meningismus, lymphadenopathy


Respiratory exam: Present: wheezes, decreased breath sounds


Cardiovascular Exam: Present: tachycardia


GI/Abdominal exam: Present: soft, normal bowel sounds.  Absent: distended, 

tenderness, guarding, rebound, rigid


Extremities exam: Present: normal inspection, full ROM, normal capillary 

refill.  Absent: tenderness, pedal edema, joint swelling, calf tenderness


Back exam: Present: normal inspection


Neurological exam: Present: alert, oriented X3, CN II-XII intact


Psychiatric exam: Present: normal affect, normal mood


Skin exam: Present: warm, dry, intact, normal color.  Absent: rash





Course


 Vital Signs











  17





  19:58 20:24 21:04


 


Temperature 103.5 F H 102 F H 


 


Pulse Rate 118 H 88 91


 


Respiratory 20 13 





Rate   


 


Blood Pressure 132/68 156/67 


 


O2 Sat by Pulse 94 L 95 





Oximetry   














  17





  21:20 21:48


 


Temperature  100.9 F H


 


Pulse Rate 102 H 100


 


Respiratory  20





Rate  


 


Blood Pressure  121/56


 


O2 Sat by Pulse  98





Oximetry  














Medical Decision Making





- Medical Decision Making





Patient is feeling much improved I did reevaluate her lungs are clear with good 

aeration bilaterally no evidence of any crepitus crackles or rhonchi.  Patient 

will be discharged she does have evidence of sinusitis and bronchospasm.  She 

will be given a new prescription for a DuoNeb be using her nebulizer as well as 

a Combivent inhaler oral steroids and antibiotics.





- Lab Data


Result diagrams: 


 17 21:19





 17 21:19


 Lab Results











  17 Range/Units





  21:10 21:19 21:19 


 


WBC   11.2 H   (3.8-10.6)  k/uL


 


RBC   4.28   (3.80-5.40)  m/uL


 


Hgb   13.4   (11.4-16.0)  gm/dL


 


Hct   39.9   (34.0-46.0)  %


 


MCV   93.2   (80.0-100.0)  fL


 


MCH   31.4   (25.0-35.0)  pg


 


MCHC   33.7   (31.0-37.0)  g/dL


 


RDW   13.0   (11.5-15.5)  %


 


Plt Count   218   (150-450)  k/uL


 


Neutrophils %   80   %


 


Lymphocytes %   12   %


 


Monocytes %   5   %


 


Eosinophils %   1   %


 


Basophils %   0   %


 


Neutrophils #   8.9 H   (1.3-7.7)  k/uL


 


Lymphocytes #   1.3   (1.0-4.8)  k/uL


 


Monocytes #   0.6   (0-1.0)  k/uL


 


Eosinophils #   0.1   (0-0.7)  k/uL


 


Basophils #   0.0   (0-0.2)  k/uL


 


PT    11.1  (9.0-12.0)  sec


 


INR    1.1  (<1.1)  


 


APTT    25.0  (22.0-30.0)  sec


 


Sodium     (137-145)  mmol/L


 


Potassium     (3.5-5.1)  mmol/L


 


Chloride     ()  mmol/L


 


Carbon Dioxide     (22-30)  mmol/L


 


Anion Gap     mmol/L


 


BUN     (7-17)  mg/dL


 


Creatinine     (0.52-1.04)  mg/dL


 


Est GFR (MDRD) Af Amer     (>60 ml/min/1.73 sqM)  


 


Est GFR (MDRD) Non-Af     (>60 ml/min/1.73 sqM)  


 


Glucose     (74-99)  mg/dL


 


Plasma Lactic Acid Anup     (0.7-2.0)  mmol/L


 


Calcium     (8.4-10.2)  mg/dL


 


Magnesium     (1.6-2.3)  mg/dL


 


Total Bilirubin     (0.2-1.3)  mg/dL


 


AST     (14-36)  U/L


 


ALT     (9-52)  U/L


 


Alkaline Phosphatase     ()  U/L


 


Total Creatine Kinase     ()  U/L


 


CK-MB (CK-2)     (0.0-2.4)  ng/mL


 


CK-MB (CK-2) Rel Index     


 


Troponin I     (0.000-0.034)  ng/mL


 


NT-Pro-B Natriuret Pep     pg/mL


 


Total Protein     (6.3-8.2)  g/dL


 


Albumin     (3.5-5.0)  g/dL


 


Influenza Type A RNA  Not Detected    (Not Detectd)  


 


Influenza Type B (PCR)  Not Detected    (Not Detectd)  














  17 Range/Units





  21:19 21:19 21:19 


 


WBC     (3.8-10.6)  k/uL


 


RBC     (3.80-5.40)  m/uL


 


Hgb     (11.4-16.0)  gm/dL


 


Hct     (34.0-46.0)  %


 


MCV     (80.0-100.0)  fL


 


MCH     (25.0-35.0)  pg


 


MCHC     (31.0-37.0)  g/dL


 


RDW     (11.5-15.5)  %


 


Plt Count     (150-450)  k/uL


 


Neutrophils %     %


 


Lymphocytes %     %


 


Monocytes %     %


 


Eosinophils %     %


 


Basophils %     %


 


Neutrophils #     (1.3-7.7)  k/uL


 


Lymphocytes #     (1.0-4.8)  k/uL


 


Monocytes #     (0-1.0)  k/uL


 


Eosinophils #     (0-0.7)  k/uL


 


Basophils #     (0-0.2)  k/uL


 


PT     (9.0-12.0)  sec


 


INR     (<1.1)  


 


APTT     (22.0-30.0)  sec


 


Sodium  138    (137-145)  mmol/L


 


Potassium  4.1    (3.5-5.1)  mmol/L


 


Chloride  105    ()  mmol/L


 


Carbon Dioxide  24    (22-30)  mmol/L


 


Anion Gap  9    mmol/L


 


BUN  11    (7-17)  mg/dL


 


Creatinine  0.81    (0.52-1.04)  mg/dL


 


Est GFR (MDRD) Af Amer  >60    (>60 ml/min/1.73 sqM)  


 


Est GFR (MDRD) Non-Af  >60    (>60 ml/min/1.73 sqM)  


 


Glucose  113 H    (74-99)  mg/dL


 


Plasma Lactic Acid Anup     (0.7-2.0)  mmol/L


 


Calcium  9.0    (8.4-10.2)  mg/dL


 


Magnesium  1.8    (1.6-2.3)  mg/dL


 


Total Bilirubin  1.0    (0.2-1.3)  mg/dL


 


AST  25    (14-36)  U/L


 


ALT  30    (9-52)  U/L


 


Alkaline Phosphatase  63    ()  U/L


 


Total Creatine Kinase   53   ()  U/L


 


CK-MB (CK-2)   <0.2   (0.0-2.4)  ng/mL


 


CK-MB (CK-2) Rel Index      


 


Troponin I   <0.012   (0.000-0.034)  ng/mL


 


NT-Pro-B Natriuret Pep    284  pg/mL


 


Total Protein  6.6    (6.3-8.2)  g/dL


 


Albumin  3.8    (3.5-5.0)  g/dL


 


Influenza Type A RNA     (Not Detectd)  


 


Influenza Type B (PCR)     (Not Detectd)  














  17 Range/Units





  21:19 


 


WBC   (3.8-10.6)  k/uL


 


RBC   (3.80-5.40)  m/uL


 


Hgb   (11.4-16.0)  gm/dL


 


Hct   (34.0-46.0)  %


 


MCV   (80.0-100.0)  fL


 


MCH   (25.0-35.0)  pg


 


MCHC   (31.0-37.0)  g/dL


 


RDW   (11.5-15.5)  %


 


Plt Count   (150-450)  k/uL


 


Neutrophils %   %


 


Lymphocytes %   %


 


Monocytes %   %


 


Eosinophils %   %


 


Basophils %   %


 


Neutrophils #   (1.3-7.7)  k/uL


 


Lymphocytes #   (1.0-4.8)  k/uL


 


Monocytes #   (0-1.0)  k/uL


 


Eosinophils #   (0-0.7)  k/uL


 


Basophils #   (0-0.2)  k/uL


 


PT   (9.0-12.0)  sec


 


INR   (<1.1)  


 


APTT   (22.0-30.0)  sec


 


Sodium   (137-145)  mmol/L


 


Potassium   (3.5-5.1)  mmol/L


 


Chloride   ()  mmol/L


 


Carbon Dioxide   (22-30)  mmol/L


 


Anion Gap   mmol/L


 


BUN   (7-17)  mg/dL


 


Creatinine   (0.52-1.04)  mg/dL


 


Est GFR (MDRD) Af Amer   (>60 ml/min/1.73 sqM)  


 


Est GFR (MDRD) Non-Af   (>60 ml/min/1.73 sqM)  


 


Glucose   (74-99)  mg/dL


 


Plasma Lactic Acid Anup  1.8  (0.7-2.0)  mmol/L


 


Calcium   (8.4-10.2)  mg/dL


 


Magnesium   (1.6-2.3)  mg/dL


 


Total Bilirubin   (0.2-1.3)  mg/dL


 


AST   (14-36)  U/L


 


ALT   (9-52)  U/L


 


Alkaline Phosphatase   ()  U/L


 


Total Creatine Kinase   ()  U/L


 


CK-MB (CK-2)   (0.0-2.4)  ng/mL


 


CK-MB (CK-2) Rel Index   


 


Troponin I   (0.000-0.034)  ng/mL


 


NT-Pro-B Natriuret Pep   pg/mL


 


Total Protein   (6.3-8.2)  g/dL


 


Albumin   (3.5-5.0)  g/dL


 


Influenza Type A RNA   (Not Detectd)  


 


Influenza Type B (PCR)   (Not Detectd)  














- EKG Data


-: EKG Interpreted by Me


EKG shows normal: sinus rhythm (Sinus rhythm a rate of 99 appear of 01 32 QRS 

duration 80 QT/QTC of 3:30/433 st-t wave changes this appears be a normal EKG)





- Radiology Data


Radiology results: report reviewed (I did review the x-rays and report no acute 

findings.), image reviewed





Disposition


Clinical Impression: 


 Acute bronchospasm, Sinusitis, Febrile illness





Disposition: HOME SELF-CARE


Condition: Good


Instructions:  Fever in Adults (ED), Sinusitis (ED), Bronchospasm (ED)


Prescriptions: 


Ipratropium-Albuterol Nebulize [Duoneb 0.5 mg-3 mg/3 ml Soln] 3 ml INHALATION 

Q6HR PRN #120 neb


 PRN Reason: Dyspnea


Ipratropium/Albuterol Sulfate [Combivent Respimat Inhaler] 2 puff INHALATION 

QID #1 inhaler


Levofloxacin [Levaquin] 500 mg PO DAILY #9 tab


predniSONE 20 mg PO BID #10 tab

## 2017-05-08 NOTE — XR
EXAMINATION TYPE: XR chest 2V

 

DATE OF EXAM: 5/8/2017 9:35 PM

 

COMPARISON: 2/10/2017

 

HISTORY: Fever

 

TECHNIQUE:  Frontal and lateral views of the chest are obtained.

 

FINDINGS:  There is no heart failure nor confluent pneumonic infiltrate. There are no hilar masses. C
ostophrenic angles are clear. There are chest leads.

 

IMPRESSION:  No active cardiopulmonary disease. No change.

## 2017-06-14 ENCOUNTER — HOSPITAL ENCOUNTER (OUTPATIENT)
Dept: HOSPITAL 47 - RADECHMAIN | Age: 62
Discharge: HOME | End: 2017-06-14
Payer: COMMERCIAL

## 2017-06-14 DIAGNOSIS — I08.1: Primary | ICD-10-CM

## 2017-06-14 PROCEDURE — 93306 TTE W/DOPPLER COMPLETE: CPT

## 2017-06-15 NOTE — ECHOF
Referral Reason:i27.0 pulmonarty htn



MEASUREMENTS

--------

HEIGHT: 167.6 cm

WEIGHT: 117.9 kg

BP: 143/89

RVIDd:   3.3 cm     (< 3.3)

IVSd:   1.4 cm     (0.6 - 1.1)

LVIDd:   4.8 cm     (3.9 - 5.3)

LVPWd:   1.5 cm     (0.6 - 1.1)

IVSs:   2.2 cm

LVIDs:   3.4 cm

LVPWs:   2.0 cm

LAESV Index (A-L):   10.40 ml/m

Ao Diam:   3.0 cm     (2.0 - 3.7)

AV Cusp:   2.0 cm     (1.5 - 2.6)

LA Diam:   3.3 cm     (2.7 - 3.8)

MV EXCURSION:   18.807 mm     (> 18.000)

MV EF SLOPE:   74 mm/s     (70 - 150)

EPSS:   0.9 cm

MV E Everardo:   0.43 m/s

MV DecT:   375 ms

MV A Everardo:   0.61 m/s

MV E/A Ratio:   0.70 

RAP:   5.00 mmHg

RVSP:   19.15 mmHg







FINDINGS

--------

Sinus rhythm.

This was a technically adequate study.

There is moderate concentric left ventricular 

hypertrophy.   Overall left ventricular systolic 

function is normal with, an EF between 55 - 60 %.

The right ventricle is normal in size and function.

Normal LA  size by volume 22+/-6 ml/m2.

The right atrium is normal in size.

Aortic valve is trileaflet and is mildly thickened.   

There is no evidence of aortic regurgitation.   There 

is no evidence of aortic stenosis.

The mitral valve leaflets are mildly thickened.   Mild 

mitral annular calcification present.   There is trace 

to mild mitral regurgitation.

Trace tricuspid regurgitation present.   There is no 

evidence of pulmonary hypertension.   The right 

ventricular systolic pressure, as measured by Doppler, 

is 19.15mmHg.

The pulmonic valve was not well visualized.

The aortic root size is normal.

Normal inferior vena cava with normal inspiratory 

collapse consistent with estimated right atrial 

pressure of  5 mmHg.

The pericardium is normal.   There is no pericardial 

effusion.



CONCLUSIONS

--------

1. Sinus rhythm.

2. There is no evidence of pulmonary hypertension.

3. The right ventricular systolic pressure, as measured by 

Doppler, is 19.15mmHg.

4. The pulmonic valve was not well visualized.

5. The aortic root size is normal.

6. There is no pericardial effusion.

7. There is moderate concentric left ventricular 

hypertrophy.

8. Overall left ventricular systolic function is normal 

with, an EF between 55 - 60 %.

9. Normal LA size by volume 22+/-6 ml/m2.

10. Aortic valve is trileaflet and is mildly thickened.

11. The mitral valve leaflets are mildly thickened.

12. Mild mitral annular calcification present.

13. There is trace to mild mitral regurgitation.

14. Trace tricuspid regurgitation present.





SONOGRAPHER: Ramses Contreras RDCS

## 2017-06-26 ENCOUNTER — HOSPITAL ENCOUNTER (OUTPATIENT)
Dept: HOSPITAL 47 - RADMAMWWP | Age: 62
Discharge: HOME | End: 2017-06-26
Payer: COMMERCIAL

## 2017-06-26 DIAGNOSIS — Z12.31: Primary | ICD-10-CM

## 2017-06-27 NOTE — MM
Reason for exam: screening  (asymptomatic).

Last mammogram was performed 1 year ago.



History:

Patient is postmenopausal.



Physical Findings:

A clinical breast exam by your physician is recommended on an annual basis and 

results should be correlated with mammographic findings.



MG Screening Mammo w CAD

Bilateral CC and MLO view(s) were taken.

Prior study comparison: June 24, 2016, bilateral MG screening mammo w CAD.  

December 2, 2013, mammogram, performed at [facility].

There are scattered fibroglandular densities.  There is chronic nodularity in the 

right breast.  No significant changes when compared with prior studies.





ASSESSMENT: Negative, BI-RAD 1



RECOMMENDATION:

Routine screening mammogram of both breasts in 1 year.

## 2018-04-20 ENCOUNTER — HOSPITAL ENCOUNTER (OUTPATIENT)
Dept: HOSPITAL 47 - RADUSWWP | Age: 63
Discharge: HOME | End: 2018-04-20
Payer: COMMERCIAL

## 2018-04-20 DIAGNOSIS — R10.9: Primary | ICD-10-CM

## 2018-04-20 PROCEDURE — 76857 US EXAM PELVIC LIMITED: CPT

## 2018-04-20 PROCEDURE — 76700 US EXAM ABDOM COMPLETE: CPT

## 2018-04-20 NOTE — US
EXAMINATION TYPE: US abdomen comp/pelvis limited

 

DATE OF EXAM: 4/20/2018

 

COMPARISON: NONE

 

CLINICAL HISTORY: R10.9 Flank pain. Right flank and back pain x 1 month, history of cholecystectomy a
nd hernia repair

 

EXAM MEASUREMENTS:

 

Liver Length:  16.5 cm   

Gallbladder Wall:  surgically absent   

CBD:  0.5 cm

Spleen:  10.3 cm   

Right Kidney:  11.1 x 4.7 x 5.2 cm 

Left Kidney:  10.7 x 5.8 x 4.9 cm  

 

**Difficult and limited study due to patient body habitus

 

Pancreas:  obscured by overlying midline bowel gas

Liver:  visualized portions appear somewhat heterogeneous  

Gallbladder:  surgically absent

CBD:  visualized portions wnl 

Spleen:  visualized portions wnl   

Right Kidney:  visualized portions wnl   

Left Kidney:  visualized portions wnl   

Upper IVC:  wnl  

Abd Aorta:  visualized portions wnl

Bladder: not fully distended 

**Bilateral Jets Seen no

 

Exam noted suboptimal due to patient's body habitus and shadowing from overlying bowel gas. Pancreas 
is suboptimally evaluated on images saved. Visualized liver is heterogeneously hyperechoic. Evaluatio
n for focal masses is suboptimal due to the heterogeneity. Gallbladder is surgically absent. Bladder 
is poorly distended and thus suboptimally evaluated.

 

IMPRESSION: Suboptimal study, no acute finding is seen to account for patient's symptoms. Probable fa
tty infiltration of liver is noted.

## 2018-07-16 ENCOUNTER — HOSPITAL ENCOUNTER (EMERGENCY)
Dept: HOSPITAL 47 - EC | Age: 63
Discharge: HOME | End: 2018-07-16
Payer: COMMERCIAL

## 2018-07-16 VITALS — RESPIRATION RATE: 18 BRPM

## 2018-07-16 VITALS — DIASTOLIC BLOOD PRESSURE: 62 MMHG | HEART RATE: 66 BPM | TEMPERATURE: 98.2 F | SYSTOLIC BLOOD PRESSURE: 128 MMHG

## 2018-07-16 DIAGNOSIS — Z87.891: ICD-10-CM

## 2018-07-16 DIAGNOSIS — Z86.711: ICD-10-CM

## 2018-07-16 DIAGNOSIS — E07.9: ICD-10-CM

## 2018-07-16 DIAGNOSIS — J40: Primary | ICD-10-CM

## 2018-07-16 DIAGNOSIS — Z88.0: ICD-10-CM

## 2018-07-16 DIAGNOSIS — Z85.41: ICD-10-CM

## 2018-07-16 DIAGNOSIS — Z79.899: ICD-10-CM

## 2018-07-16 DIAGNOSIS — Z96.653: ICD-10-CM

## 2018-07-16 DIAGNOSIS — Z79.01: ICD-10-CM

## 2018-07-16 DIAGNOSIS — E78.5: ICD-10-CM

## 2018-07-16 LAB
ALBUMIN SERPL-MCNC: 4.2 G/DL (ref 3.5–5)
ALP SERPL-CCNC: 59 U/L (ref 38–126)
ALT SERPL-CCNC: 46 U/L (ref 9–52)
ANION GAP SERPL CALC-SCNC: 10 MMOL/L
APTT BLD: 27.6 SEC (ref 22–30)
AST SERPL-CCNC: 33 U/L (ref 14–36)
BASOPHILS # BLD AUTO: 0.1 K/UL (ref 0–0.2)
BASOPHILS NFR BLD AUTO: 1 %
BUN SERPL-SCNC: 14 MG/DL (ref 7–17)
CALCIUM SPEC-MCNC: 9.5 MG/DL (ref 8.4–10.2)
CHLORIDE SERPL-SCNC: 104 MMOL/L (ref 98–107)
CO2 SERPL-SCNC: 27 MMOL/L (ref 22–30)
EOSINOPHIL # BLD AUTO: 0.2 K/UL (ref 0–0.7)
EOSINOPHIL NFR BLD AUTO: 3 %
ERYTHROCYTE [DISTWIDTH] IN BLOOD BY AUTOMATED COUNT: 4.78 M/UL (ref 3.8–5.4)
ERYTHROCYTE [DISTWIDTH] IN BLOOD: 13.7 % (ref 11.5–15.5)
GLUCOSE SERPL-MCNC: 84 MG/DL (ref 74–99)
HCT VFR BLD AUTO: 43 % (ref 34–46)
HGB BLD-MCNC: 14.2 GM/DL (ref 11.4–16)
INR PPP: 1.2 (ref ?–1.2)
LYMPHOCYTES # SPEC AUTO: 1.9 K/UL (ref 1–4.8)
LYMPHOCYTES NFR SPEC AUTO: 34 %
MCH RBC QN AUTO: 29.6 PG (ref 25–35)
MCHC RBC AUTO-ENTMCNC: 32.9 G/DL (ref 31–37)
MCV RBC AUTO: 89.9 FL (ref 80–100)
MONOCYTES # BLD AUTO: 0.4 K/UL (ref 0–1)
MONOCYTES NFR BLD AUTO: 8 %
NEUTROPHILS # BLD AUTO: 2.9 K/UL (ref 1.3–7.7)
NEUTROPHILS NFR BLD AUTO: 52 %
PLATELET # BLD AUTO: 255 K/UL (ref 150–450)
POTASSIUM SERPL-SCNC: 4.1 MMOL/L (ref 3.5–5.1)
PROT SERPL-MCNC: 6.9 G/DL (ref 6.3–8.2)
PT BLD: 11.8 SEC (ref 9–12)
SODIUM SERPL-SCNC: 141 MMOL/L (ref 137–145)
WBC # BLD AUTO: 5.5 K/UL (ref 3.8–10.6)

## 2018-07-16 PROCEDURE — 71046 X-RAY EXAM CHEST 2 VIEWS: CPT

## 2018-07-16 PROCEDURE — 93005 ELECTROCARDIOGRAM TRACING: CPT

## 2018-07-16 PROCEDURE — 36415 COLL VENOUS BLD VENIPUNCTURE: CPT

## 2018-07-16 PROCEDURE — 71275 CT ANGIOGRAPHY CHEST: CPT

## 2018-07-16 PROCEDURE — 99285 EMERGENCY DEPT VISIT HI MDM: CPT

## 2018-07-16 PROCEDURE — 85025 COMPLETE CBC W/AUTO DIFF WBC: CPT

## 2018-07-16 PROCEDURE — 94640 AIRWAY INHALATION TREATMENT: CPT

## 2018-07-16 PROCEDURE — 83880 ASSAY OF NATRIURETIC PEPTIDE: CPT

## 2018-07-16 PROCEDURE — 84484 ASSAY OF TROPONIN QUANT: CPT

## 2018-07-16 PROCEDURE — 85730 THROMBOPLASTIN TIME PARTIAL: CPT

## 2018-07-16 PROCEDURE — 85610 PROTHROMBIN TIME: CPT

## 2018-07-16 PROCEDURE — 80053 COMPREHEN METABOLIC PANEL: CPT

## 2018-07-16 NOTE — XR
EXAMINATION TYPE: XR chest 2V

 

DATE OF EXAM: 7/16/2018

 

COMPARISON: 5/8/2017

 

HISTORY: 62-year-old female difficulty in breathing

 

TECHNIQUE:  PA and lateral views

 

FINDINGS:  

The heart is normal size. Aorta and pulmonary vasculature within normal limits. Degenerative sclerosi
s at the left first rib end. Strandy areas of atelectasis peripheral mid and lower lungs. No consolid
ation or pleural effusion.

 

 

IMPRESSION:  

No acute cardiopulmonary processes.

## 2018-07-16 NOTE — CT
EXAMINATION TYPE: CT chest angio for PE

 

DATE OF EXAM: 7/16/2018

 

COMPARISON: NONE

 

HISTORY: SOB, hx of multiple PE.

 

CT DLP: 548.1 mGycm. Automated Exposure Control for Dose Reduction was Utilized.

 

 

CONTRAST: 

CTA scan of the thorax is performed with IV Contrast, patient injected with 100 mL of Isovue 370, pul
monary embolism protocol.  MIP Images are created on CT scanner and reviewed.

 

FINDINGS:

 

LUNGS: The lungs are grossly clear, there is no concerning parenchymal mass or nodule identified.   T
here is no pleural effusion or pneumothorax seen.  The tracheobronchial tree is patent.

 

MEDIASTINUM: There is satisfactory enhancement of the pulmonary artery and its branches, there is no 
CT evidence for pulmonary embolism.  There are no greater than 1 cm hilar or mediastinal lymph nodes.
 Heart is mildly enlarged. No pericardial effusion. Main pulmonary artery is enlarged measuring 3.3 c
m. This may clinically correlate with pulmonary drop retention. Scant coronary arteries also patients
 are seen within the left anterior descending coronary artery. Aortic root is within normal limits me
asuring 3.6 cm.

 

OTHER: Moderate pancreatic parenchymal atrophy is incidentally identified. There is reflux of contras
t into the inferior vena cava and proximal hepatic veins. Low attenuation of the hepatic parenchyma i
s seen and approaching criteria for hepatic steatosis. Gallbladder surgically absent. Old healed frac
ture deformity seen of the right lateral margin of rib 8. Moderate multilevel degenerative changes of
 the thoracic spine are noted.

 

IMPRESSION: 

1. No evidence of pulmonary embolus.

2. Slight reflux of contrast into the inferior vena cava and hepatic veins in combination with cardio
megaly may relate to a degree of right-sided heart failure.

3. Enlargement of the main pulmonary artery suggestive of underlying pulmonary arterial hypertension.

## 2018-07-16 NOTE — ED
General Adult HPI





- General


Chief complaint: Shortness of Breath


Stated complaint: poss PE


Time Seen by Provider: 18 13:19


Source: patient


Mode of arrival: ambulatory


Limitations: no limitations





- History of Present Illness


Initial comments: 


Fabienne is a 61 yo female with PMH significant for unprovoked pulmonary 

embolism diagnosed last year, currently on Xarelto who presents to the ED today 

for evaluation of shortness of breath and chest heaviness.  She reports that 

she has had a minimally productive cough for approximately 3 weeks duration, 

she reports that this has been improving over the past couple of days but she 

is beginning to feel progressively more short of breath and an experiencing 

some chest heaviness.  She contacted her primary care physician who advised her 

to come to the ER for evaluation of possible recurrent or worsening pulmonary 

embolism.  Patient was evaluated in February & 2017 at which time she 

had a CT identified pulmonary embolism. 





Patient denies any fevers, chills, nausea, vomiting or any change in bowel or 

bladder habits.  She denies any headache, vision changes, nasal congestion or 

rhinorrhea.  She reports that she is otherwise been in her usual state of 

health but is concerned about the pressure she is experiencing her chest.








- Related Data


 Home Medications











 Medication  Instructions  Recorded  Confirmed


 


Levothyroxine Sodium [Synthroid] 150 mcg PO DAILY 02/10/17 07/16/18


 


Atorvastatin [Lipitor] 40 mg PO HS 17


 


Furosemide [Lasix] 20 mg PO DAILY 17


 


Metoprolol Tartrate 25 mg PO DAILY 17


 


Cholecalciferol [Vitamin D3] 1,000 unit PO DAILY 18


 


Rivaroxaban [Xarelto] 20 mg PO DAILY 18








 Previous Rx's











 Medication  Instructions  Recorded


 


predniSONE [Deltasone] 40 mg PO DAILY 5 Days #10 tablet 18











 Allergies











Allergy/AdvReac Type Severity Reaction Status Date / Time


 


Penicillins Allergy  Swelling Verified 18 13:20














Review of Systems


ROS Statement: 


Those systems with pertinent positive or pertinent negative responses have been 

documented in the HPI.





ROS Other: All systems not noted in ROS Statement are negative.


Constitutional: Denies: fever


Eyes: Denies: eye pain


ENT: Denies: ear pain


Respiratory: Reports: cough, dyspnea.  Denies: wheezes


Cardiovascular: Reports: chest pain (chest pressure)


Endocrine: Denies: fatigue


Gastrointestinal: Denies: abdominal pain


Genitourinary: Denies: urgency, dysuria


Musculoskeletal: Denies: back pain


Skin: Denies: rash, lesions


Psychiatric: Denies: anxiety, depression


Hematological/Lymphatic: Denies: easy bleeding, easy bruising





Past Medical History


Past Medical History: Chest Pain / Angina, Hyperlipidemia, Pneumonia, Pulmonary 

Embolus (PE), Thyroid Disorder


Additional Past Medical History / Comment(s): PAPITATIONS, PE with residual 

clots


History of Any Multi-Drug Resistant Organisms: None Reported


Past Surgical History:  Section, Cholecystectomy, Hernia Repair, 

Orthopedic Surgery, Tonsillectomy


Additional Past Surgical History / Comment(s): ONE DENTAL IMPLANT, COLONOSCOPY,

HEMORRHOIDECTOMY,PILONIDAL CYST REMOVED AGE 17, CERVICAL BX-PRE CANCEROUS CELLS-

HAD PROCEDURE , HAD BROKEN IUD REMOVED AND PT STATED THEY WENT IN LIKE HAVING A 

 TO REMOVED BROKEN PEICES,DONNA KNEE REPLACEMENTS


Past Anesthesia/Blood Transfusion Reactions: No Reported Reaction


Past Psychological History: No Psychological Hx Reported


Smoking Status: Former smoker


Past Alcohol Use History: None Reported, Occasional


Past Drug Use History: None Reported





- Past Family History


  ** Father


Family Medical History: Congestive Heart Failure (CHF)


Additional Family Medical History / Comment(s):  AGE 78





  ** Mother


Additional Family Medical History / Comment(s): PARKINSONS- AGE 74.     AN 

AUNT HAD BRAIN AND ABD ANUERYSM.





General Exam


Limitations: no limitations


General appearance: alert, in no apparent distress


Head exam: Present: atraumatic, normocephalic


Eye exam: Present: normal appearance, PERRL


ENT exam: Present: normal exam


Neck exam: Present: normal inspection.  Absent: tenderness


Respiratory exam: Present: wheezes (mild expiratory wheeze)


Cardiovascular Exam: Present: regular rate, normal rhythm.  Absent: tachycardia

, systolic murmur, diastolic murmur


GI/Abdominal exam: Present: soft.  Absent: distended, tenderness


Rectal exam: Present: deferred


Extremities exam: Present: normal inspection, normal capillary refill.  Absent: 

pedal edema


Back exam: Present: normal inspection


Neurological exam: Present: alert, oriented X3


Psychiatric exam: Present: normal affect, normal mood


Skin exam: Present: warm, dry





Course


 Vital Signs











  18





  12:32 13:59 15:17


 


Temperature 98.5 F  


 


Pulse Rate 65 60 


 


Respiratory 18 18 18





Rate   


 


Blood Pressure 121/70 126/58 


 


O2 Sat by Pulse 96 97 





Oximetry   














  18





  15:22 15:28 15:42


 


Temperature 97.9 F  


 


Pulse Rate 57 L 60 64


 


Respiratory 18  





Rate   


 


Blood Pressure 133/62  


 


O2 Sat by Pulse 97  





Oximetry   














  18





  16:20


 


Temperature 98.2 F


 


Pulse Rate 66


 


Respiratory 18





Rate 


 


Blood Pressure 128/62


 


O2 Sat by Pulse 98





Oximetry 














EKG Findings





- EKG Comments:


EKG Findings:: EKG at 1433 - rate is 55, rhythm is sinus bradycardia, normal 

axis, no meningeal intervals, no acute ST elevations or depressions, no 

evidence of acute right heart strain.  No evidence of acute ischemia or 

infarction.





Medical Decision Making





- Medical Decision Making


Patient was seen and evaluated, history was obtained from the patient review of 

medical records


She with a history of pulmonary embolism in the past, currently on oral 

anticoagulant


Patient with 3 weeks of nonproductive cough now with some chest tightness and 

heaviness, patient concern that this is similar to previous pulmonary 

embolisms.  Patient is not hypoxic, not tachycardic, on exam I am more 

concerned that the patient may have an acute bronchitis.  Patient has not 

attempted to take any breathing treatments at home for this





Labs and imaging were ordered





Labs and imaging unremarkable CT pulmonary embolism study was negative for 

acute pulmonary embolism.





Results were discussed with patient who expresses significant relief that there 

is no pulmonary embolism.  I advised her to stay on her oral anticoagulant, and 

addition I do feel that she has bronchitis.  We will treat with prednisone for 

5 days.  Patient is agreeable to this.  Patient has nebulizer with DuoNeb's 

home and will take these, I advised patient she can take a nebulizer up to once 

every 4 hours however don't think that she will need it that frequently as her 

symptoms seem to be minimal.  I advised her to use the nebulizer at least 2 

times daily.  All questions pertaining to care were answered to the best of my 

ability, patient was discharged home in stable condition with a diagnosis of 

bronchitis and a plan to use her nebulizer and steroids.  Patient was advised 

to return to the hospital should she develop any worsening chest pressure, 

chest pain, shortness breath or any new or concerning symptoms.








- Lab Data


Result diagrams: 


 18 13:50





 18 13:50


 Lab Results











  18 Range/Units





  13:50 13:50 13:50 


 


WBC  5.5    (3.8-10.6)  k/uL


 


RBC  4.78    (3.80-5.40)  m/uL


 


Hgb  14.2    (11.4-16.0)  gm/dL


 


Hct  43.0    (34.0-46.0)  %


 


MCV  89.9    (80.0-100.0)  fL


 


MCH  29.6    (25.0-35.0)  pg


 


MCHC  32.9    (31.0-37.0)  g/dL


 


RDW  13.7    (11.5-15.5)  %


 


Plt Count  255    (150-450)  k/uL


 


Neutrophils %  52    %


 


Lymphocytes %  34    %


 


Monocytes %  8    %


 


Eosinophils %  3    %


 


Basophils %  1    %


 


Neutrophils #  2.9    (1.3-7.7)  k/uL


 


Lymphocytes #  1.9    (1.0-4.8)  k/uL


 


Monocytes #  0.4    (0-1.0)  k/uL


 


Eosinophils #  0.2    (0-0.7)  k/uL


 


Basophils #  0.1    (0-0.2)  k/uL


 


PT     (9.0-12.0)  sec


 


INR     (<1.2)  


 


APTT     (22.0-30.0)  sec


 


Sodium   141   (137-145)  mmol/L


 


Potassium   4.1   (3.5-5.1)  mmol/L


 


Chloride   104   ()  mmol/L


 


Carbon Dioxide   27   (22-30)  mmol/L


 


Anion Gap   10   mmol/L


 


BUN   14   (7-17)  mg/dL


 


Creatinine   0.85   (0.52-1.04)  mg/dL


 


Est GFR (CKD-EPI)AfAm   85   (>60 ml/min/1.73 sqM)  


 


Est GFR (CKD-EPI)NonAf   73   (>60 ml/min/1.73 sqM)  


 


Glucose   84   (74-99)  mg/dL


 


Calcium   9.5   (8.4-10.2)  mg/dL


 


Total Bilirubin   0.6   (0.2-1.3)  mg/dL


 


AST   33   (14-36)  U/L


 


ALT   46   (9-52)  U/L


 


Alkaline Phosphatase   59   ()  U/L


 


Troponin I     (0.000-0.034)  ng/mL


 


NT-Pro-B Natriuret Pep    44  pg/mL


 


Total Protein   6.9   (6.3-8.2)  g/dL


 


Albumin   4.2   (3.5-5.0)  g/dL














  18 Range/Units





  13:50 13:50 


 


WBC    (3.8-10.6)  k/uL


 


RBC    (3.80-5.40)  m/uL


 


Hgb    (11.4-16.0)  gm/dL


 


Hct    (34.0-46.0)  %


 


MCV    (80.0-100.0)  fL


 


MCH    (25.0-35.0)  pg


 


MCHC    (31.0-37.0)  g/dL


 


RDW    (11.5-15.5)  %


 


Plt Count    (150-450)  k/uL


 


Neutrophils %    %


 


Lymphocytes %    %


 


Monocytes %    %


 


Eosinophils %    %


 


Basophils %    %


 


Neutrophils #    (1.3-7.7)  k/uL


 


Lymphocytes #    (1.0-4.8)  k/uL


 


Monocytes #    (0-1.0)  k/uL


 


Eosinophils #    (0-0.7)  k/uL


 


Basophils #    (0-0.2)  k/uL


 


PT  11.8   (9.0-12.0)  sec


 


INR  1.2 H   (<1.2)  


 


APTT  27.6   (22.0-30.0)  sec


 


Sodium    (137-145)  mmol/L


 


Potassium    (3.5-5.1)  mmol/L


 


Chloride    ()  mmol/L


 


Carbon Dioxide    (22-30)  mmol/L


 


Anion Gap    mmol/L


 


BUN    (7-17)  mg/dL


 


Creatinine    (0.52-1.04)  mg/dL


 


Est GFR (CKD-EPI)AfAm    (>60 ml/min/1.73 sqM)  


 


Est GFR (CKD-EPI)NonAf    (>60 ml/min/1.73 sqM)  


 


Glucose    (74-99)  mg/dL


 


Calcium    (8.4-10.2)  mg/dL


 


Total Bilirubin    (0.2-1.3)  mg/dL


 


AST    (14-36)  U/L


 


ALT    (9-52)  U/L


 


Alkaline Phosphatase    ()  U/L


 


Troponin I   <0.012  (0.000-0.034)  ng/mL


 


NT-Pro-B Natriuret Pep    pg/mL


 


Total Protein    (6.3-8.2)  g/dL


 


Albumin    (3.5-5.0)  g/dL














Disposition


Clinical Impression: 


 Bronchitis





Disposition: HOME SELF-CARE


Condition: Good


Instructions:  Acute Bronchitis (ED)


Prescriptions: 


predniSONE [Deltasone] 40 mg PO DAILY 5 Days #10 tablet


Is patient prescribed a controlled substance at d/c from ED?: No


Referrals: 


Elisha Mae MD [Primary Care Provider] - 1-2 days

## 2018-08-20 ENCOUNTER — HOSPITAL ENCOUNTER (OUTPATIENT)
Dept: HOSPITAL 47 - RADMAMWWP | Age: 63
Discharge: HOME | End: 2018-08-20
Payer: COMMERCIAL

## 2018-08-20 DIAGNOSIS — Z12.31: Primary | ICD-10-CM

## 2018-08-20 PROCEDURE — 77067 SCR MAMMO BI INCL CAD: CPT

## 2018-08-21 NOTE — MM
Reason for exam: screening  (asymptomatic).

Last mammogram was performed 1 year and 2 months ago.



History:

Patient is postmenopausal.



Physical Findings:

A clinical breast exam by your physician is recommended on an annual basis and 

results should be correlated with mammographic findings.



MG Screening Mammo w CAD

Bilateral CC and MLO view(s) were taken.

Prior study comparison: June 26, 2017, bilateral MG screening mammo w CAD.  June 24, 2016, bilateral MG screening mammo w CAD.

There are scattered fibroglandular densities.  No significant changes when 

compared with prior studies.





ASSESSMENT: Benign, BI-RAD 2



RECOMMENDATION:

Routine screening mammogram of both breasts in 1 year.

## 2018-09-12 ENCOUNTER — HOSPITAL ENCOUNTER (OUTPATIENT)
Dept: HOSPITAL 47 - RADBDWWP | Age: 63
Discharge: HOME | End: 2018-09-12
Payer: COMMERCIAL

## 2018-09-12 DIAGNOSIS — Z13.820: ICD-10-CM

## 2018-09-12 DIAGNOSIS — R92.8: Primary | ICD-10-CM

## 2018-09-12 DIAGNOSIS — D25.1: ICD-10-CM

## 2018-09-12 DIAGNOSIS — D25.2: ICD-10-CM

## 2018-09-12 PROCEDURE — 76830 TRANSVAGINAL US NON-OB: CPT

## 2018-09-12 PROCEDURE — 77080 DXA BONE DENSITY AXIAL: CPT

## 2018-09-12 PROCEDURE — 76856 US EXAM PELVIC COMPLETE: CPT

## 2018-09-12 NOTE — US
EXAMINATION TYPE: US pelvis complete transvag

 

DATE OF EXAM: 2018

 

COMPARISON: NONE

 

CLINICAL HISTORY:  62-year-old female R10.2 OvaryPain.

 

TECHNIQUE: Transabdominal sonographic images of the pelvis were acquired.  Transvaginal sonographic i
mages were medically necessary to better assess the following anatomy: Uterus and ovaries 

 

Date of LMP:  Around age 39

 

FINDINGS:

 

EXAM MEASUREMENTS:

 

Uterus:  5.4 x 2.3 x 3.1 cm

Endometrial Stripe: 0.3 cm

Right Ovary:  1.5 x 0.8 x 0.7cm

Left Ovary:  Not visualized with certainty 

 

 

 

1. Uterus:  Anteverted. There is an apparent  scar.  There is a round focal fibroid measurin
g 7 mm along the right anterior uterine body that appears partially intramural and partially subseros
al.

2. Endometrium:  wnl

3. Right Ovary:  wnl

4. Left Ovary:  Not visualized with certainty. Obscured by bowel gas 

5. Bilateral Adnexa:  wnl

6. Posterior cul-de-sac:  wnl

 

 

 

IMPRESSION: 

Apparent  scar.

 

7 mm focal fibroid along the right anterior uterine body is partially subserosal and partially intram
ural.

 

Left ovary could not be visualized.

## 2018-09-13 NOTE — USB
Reason for exam: clinical finding.



History:

Patient is postmenopausal.



Physical Findings:

Nurse did not find any significant physical abnormalities on exam.



US Breast LT

Left complete breast ultrasound includes all four quadrants, the retroareolar 

region and axilla. Finding demonstrates no cystic or solid lesion seen. Normal 

small node noted in the axilla. Patient reports "grainy" palpable abnormality in 

the left axilla.



These results were verbally communicated with the patient and result sheet given 

to the patient on 9/12/18.





ASSESSMENT: Negative, BI-RAD 1



RECOMMENDATION:

Return to routine screening mammogram schedule for both breasts.

Manage on a clinical basis with regard to any suspicious palpable abnormality.

## 2018-09-13 NOTE — BD
EXAMINATION TYPE: Axial Bone Density

 

DATE OF EXAM: 9/12/2018

 

COMPARISON: NONE

 

CLINICAL HISTORY: Postmenopausal female

 

Height:  5 FT 5 IN

Weight:  283

 

     

RISK FACTORS 

HISTORY OF: 

Active: YES

Postmenopausal woman: IN HER 40'S SOMETIME

 

MEDICATIONS: 

Thyroid Medications:  YES

Which medication: LEVOTHYROXINE

How Long: SINCE AGE 17

How Long:

Additional Medications: LEVOTHYROXINE, D3, METOPROLOL, FUROSEMIDE, XARELTO,ATORVASTATIN 

 

 

Additional History:

 

 

EXAM MEASUREMENTS: 

Bone mineral densitometry was performed using the [employer] System.

Bone mineral density as measured about the Lumbar spine is:  

----- L1-L4(G/cm2): 1.270

T Score Values are as follows:

----- L2: 0.2

----- L3: 0.0

----- L4: 1.1

----- L1-L4: 0.8

BASELINE

 

Bone mineral density about the R hip (g/cm2): 0.924

Bone mineral density about the L hip (g/cm2): 0.922

T Score values are as follows:

-----R Neck: -0.8

-----L Neck: -0.8

-----R Total: 0.2

-----L Total: 0.4

BASELINE

 

IMPRESSION:

Normal (Values between +1 and -1 indicate normal bone mass).  Consider repeating this study in 5 year
s or sooner if there is some new clinical indication.

 

 

NOTE:  T-SCORE=SD OF THE YOUNG ADULT MEAN.

## 2018-09-25 ENCOUNTER — HOSPITAL ENCOUNTER (OUTPATIENT)
Dept: HOSPITAL 47 - EC | Age: 63
Setting detail: OBSERVATION
LOS: 3 days | Discharge: HOME | End: 2018-09-28
Payer: COMMERCIAL

## 2018-09-25 VITALS — BODY MASS INDEX: 45.1 KG/M2

## 2018-09-25 DIAGNOSIS — I20.9: ICD-10-CM

## 2018-09-25 DIAGNOSIS — J44.1: Primary | ICD-10-CM

## 2018-09-25 DIAGNOSIS — Z82.0: ICD-10-CM

## 2018-09-25 DIAGNOSIS — E66.9: ICD-10-CM

## 2018-09-25 DIAGNOSIS — R79.89: ICD-10-CM

## 2018-09-25 DIAGNOSIS — B37.0: ICD-10-CM

## 2018-09-25 DIAGNOSIS — Z96.653: ICD-10-CM

## 2018-09-25 DIAGNOSIS — Z86.711: ICD-10-CM

## 2018-09-25 DIAGNOSIS — Z79.899: ICD-10-CM

## 2018-09-25 DIAGNOSIS — E78.5: ICD-10-CM

## 2018-09-25 DIAGNOSIS — J44.0: ICD-10-CM

## 2018-09-25 DIAGNOSIS — Z90.49: ICD-10-CM

## 2018-09-25 DIAGNOSIS — Z86.718: ICD-10-CM

## 2018-09-25 DIAGNOSIS — Z79.890: ICD-10-CM

## 2018-09-25 DIAGNOSIS — Z88.0: ICD-10-CM

## 2018-09-25 DIAGNOSIS — Z79.01: ICD-10-CM

## 2018-09-25 DIAGNOSIS — Z87.01: ICD-10-CM

## 2018-09-25 DIAGNOSIS — Z82.49: ICD-10-CM

## 2018-09-25 DIAGNOSIS — Z87.09: ICD-10-CM

## 2018-09-25 DIAGNOSIS — J20.9: ICD-10-CM

## 2018-09-25 DIAGNOSIS — E03.9: ICD-10-CM

## 2018-09-25 DIAGNOSIS — Z87.891: ICD-10-CM

## 2018-09-25 LAB
ALBUMIN SERPL-MCNC: 4.1 G/DL (ref 3.5–5)
ALP SERPL-CCNC: 56 U/L (ref 38–126)
ALT SERPL-CCNC: 61 U/L (ref 9–52)
ANION GAP SERPL CALC-SCNC: 9 MMOL/L
APTT BLD: 22.6 SEC (ref 22–30)
AST SERPL-CCNC: 48 U/L (ref 14–36)
BASOPHILS # BLD AUTO: 0.1 K/UL (ref 0–0.2)
BASOPHILS NFR BLD AUTO: 1 %
BUN SERPL-SCNC: 9 MG/DL (ref 7–17)
CALCIUM SPEC-MCNC: 9.7 MG/DL (ref 8.4–10.2)
CHLORIDE SERPL-SCNC: 107 MMOL/L (ref 98–107)
CO2 SERPL-SCNC: 26 MMOL/L (ref 22–30)
EOSINOPHIL # BLD AUTO: 0.2 K/UL (ref 0–0.7)
EOSINOPHIL NFR BLD AUTO: 4 %
ERYTHROCYTE [DISTWIDTH] IN BLOOD BY AUTOMATED COUNT: 4.58 M/UL (ref 3.8–5.4)
ERYTHROCYTE [DISTWIDTH] IN BLOOD: 13.7 % (ref 11.5–15.5)
GLUCOSE BLD-MCNC: 162 MG/DL (ref 75–99)
GLUCOSE BLD-MCNC: 207 MG/DL (ref 75–99)
GLUCOSE SERPL-MCNC: 108 MG/DL (ref 74–99)
HCT VFR BLD AUTO: 43.2 % (ref 34–46)
HGB BLD-MCNC: 13.8 GM/DL (ref 11.4–16)
INR PPP: 1 (ref ?–1.2)
LYMPHOCYTES # SPEC AUTO: 2.1 K/UL (ref 1–4.8)
LYMPHOCYTES NFR SPEC AUTO: 40 %
MCH RBC QN AUTO: 30 PG (ref 25–35)
MCHC RBC AUTO-ENTMCNC: 31.9 G/DL (ref 31–37)
MCV RBC AUTO: 94.3 FL (ref 80–100)
MONOCYTES # BLD AUTO: 0.4 K/UL (ref 0–1)
MONOCYTES NFR BLD AUTO: 8 %
NEUTROPHILS # BLD AUTO: 2.2 K/UL (ref 1.3–7.7)
NEUTROPHILS NFR BLD AUTO: 44 %
PLATELET # BLD AUTO: 263 K/UL (ref 150–450)
POTASSIUM SERPL-SCNC: 4.5 MMOL/L (ref 3.5–5.1)
PROT SERPL-MCNC: 7 G/DL (ref 6.3–8.2)
PT BLD: 9.9 SEC (ref 9–12)
SODIUM SERPL-SCNC: 142 MMOL/L (ref 137–145)
WBC # BLD AUTO: 5.1 K/UL (ref 3.8–10.6)

## 2018-09-25 PROCEDURE — 71046 X-RAY EXAM CHEST 2 VIEWS: CPT

## 2018-09-25 PROCEDURE — 85379 FIBRIN DEGRADATION QUANT: CPT

## 2018-09-25 PROCEDURE — 96376 TX/PRO/DX INJ SAME DRUG ADON: CPT

## 2018-09-25 PROCEDURE — 94640 AIRWAY INHALATION TREATMENT: CPT

## 2018-09-25 PROCEDURE — 80053 COMPREHEN METABOLIC PANEL: CPT

## 2018-09-25 PROCEDURE — 87040 BLOOD CULTURE FOR BACTERIA: CPT

## 2018-09-25 PROCEDURE — 36415 COLL VENOUS BLD VENIPUNCTURE: CPT

## 2018-09-25 PROCEDURE — 85025 COMPLETE CBC W/AUTO DIFF WBC: CPT

## 2018-09-25 PROCEDURE — 99285 EMERGENCY DEPT VISIT HI MDM: CPT

## 2018-09-25 PROCEDURE — 96375 TX/PRO/DX INJ NEW DRUG ADDON: CPT

## 2018-09-25 PROCEDURE — 85610 PROTHROMBIN TIME: CPT

## 2018-09-25 PROCEDURE — 85730 THROMBOPLASTIN TIME PARTIAL: CPT

## 2018-09-25 PROCEDURE — 94760 N-INVAS EAR/PLS OXIMETRY 1: CPT

## 2018-09-25 PROCEDURE — 83036 HEMOGLOBIN GLYCOSYLATED A1C: CPT

## 2018-09-25 PROCEDURE — 83605 ASSAY OF LACTIC ACID: CPT

## 2018-09-25 PROCEDURE — 97161 PT EVAL LOW COMPLEX 20 MIN: CPT

## 2018-09-25 PROCEDURE — 96374 THER/PROPH/DIAG INJ IV PUSH: CPT

## 2018-09-25 PROCEDURE — 93971 EXTREMITY STUDY: CPT

## 2018-09-25 RX ADMIN — INSULIN ASPART SCH UNIT: 100 INJECTION, SOLUTION INTRAVENOUS; SUBCUTANEOUS at 21:42

## 2018-09-25 RX ADMIN — NYSTATIN SCH UNIT: 100000 SUSPENSION ORAL at 21:42

## 2018-09-25 RX ADMIN — GUAIFENESIN AND CODEINE PHOSPHATE PRN ML: 10; 100 LIQUID ORAL at 17:52

## 2018-09-25 RX ADMIN — METOPROLOL TARTRATE SCH MG: 25 TABLET, FILM COATED ORAL at 14:47

## 2018-09-25 RX ADMIN — METHYLPREDNISOLONE SODIUM SUCCINATE SCH MG: 125 INJECTION, POWDER, FOR SOLUTION INTRAMUSCULAR; INTRAVENOUS at 23:34

## 2018-09-25 RX ADMIN — METHYLPREDNISOLONE SODIUM SUCCINATE SCH MG: 125 INJECTION, POWDER, FOR SOLUTION INTRAMUSCULAR; INTRAVENOUS at 13:12

## 2018-09-25 RX ADMIN — FUROSEMIDE SCH MG: 20 TABLET ORAL at 14:47

## 2018-09-25 RX ADMIN — IPRATROPIUM BROMIDE AND ALBUTEROL SULFATE SCH ML: .5; 3 SOLUTION RESPIRATORY (INHALATION) at 15:35

## 2018-09-25 RX ADMIN — LEVOTHYROXINE SODIUM SCH: 75 TABLET ORAL at 14:47

## 2018-09-25 RX ADMIN — NYSTATIN SCH UNIT: 100000 SUSPENSION ORAL at 15:33

## 2018-09-25 RX ADMIN — RIVAROXABAN SCH MG: 20 TABLET, FILM COATED ORAL at 14:46

## 2018-09-25 RX ADMIN — IPRATROPIUM BROMIDE AND ALBUTEROL SULFATE SCH ML: .5; 3 SOLUTION RESPIRATORY (INHALATION) at 19:19

## 2018-09-25 RX ADMIN — IPRATROPIUM BROMIDE AND ALBUTEROL SULFATE SCH: .5; 3 SOLUTION RESPIRATORY (INHALATION) at 12:16

## 2018-09-25 RX ADMIN — METHYLPREDNISOLONE SODIUM SUCCINATE SCH MG: 125 INJECTION, POWDER, FOR SOLUTION INTRAMUSCULAR; INTRAVENOUS at 17:54

## 2018-09-25 RX ADMIN — NYSTATIN SCH UNIT: 100000 SUSPENSION ORAL at 17:54

## 2018-09-25 RX ADMIN — INSULIN ASPART SCH UNIT: 100 INJECTION, SOLUTION INTRAVENOUS; SUBCUTANEOUS at 17:51

## 2018-09-25 NOTE — US
EXAMINATION TYPE: US venous doppler duplex LE RT

 

DATE OF EXAM: 9/25/2018 9:25 AM

 

COMPARISON: Bilateral lower extremity venous ultrasound December 11, 2017

 

CLINICAL HISTORY: Pain. Pain right lower leg for 10 days. Patient on blood thinner for 2 years, histo
ry of PE

 

SIDE PERFORMED: right  

 

TECHNIQUE:  The lower extremity deep venous system is examined utilizing real time linear array sonog
osito with graded compression, doppler sonography and color-flow sonography.

 

VESSELS IMAGED:

External Iliac Vein (EIV)

Common Femoral Vein

Deep Femoral Vein

Greater Saphenous Vein *

Femoral Vein

Popliteal Vein

Small Saphenous Vein *

Proximal Calf Veins

(* superficial vessels)

 

 

 

Right Leg:  No evidence of DVT as visualized 

 

 

Grayscale, color doppler, spectral doppler imaging performed of the deep veins of the right lower ext
remity.  There is normal flow, compressibility, vascular waveforms.

 

IMPRESSION: No ultrasound evidence for acute DVT in the right lower extremity.

## 2018-09-25 NOTE — XR
EXAMINATION TYPE: XR chest 2V

 

DATE OF EXAM: 9/25/2018

 

COMPARISON: 7/16/2018

 

TECHNIQUE: PA and lateral views submitted.

 

HISTORY: Cough

 

FINDINGS:

The lungs are clear and  there is no pneumothorax, pleural effusion, or focal pneumonia.  Sclerosis i
nvolving the anterior margin the first rib stable. Arthropathy of the shoulders. Hypertrophic and deg
enerative change of the spine. Surgical clips in the abdomen.

 

IMPRESSION: 

1. No acute process.

## 2018-09-25 NOTE — P.HPIM
History of Present Illness


H&P Date: 18


Chief Complaint: Cough with shortness of breath





This is a 62-year-old female, patient of Dr. Mae.  She has a known past 

medical history of left leg DVT and PE diagnosed in 2016 and she is 

anticoagulated with Xarelto.  She also has a history of hypothyroidism, COPD 

and hyperlipidemia.  She is a former smoker.  Patient reports that she has been 

having a cough with congestion and shortness of breath for almost a week.  She 

left denies states to travel to [place] on the .  And while she was there 

developed congestion with runny nose also having sweats.  She's having a cough 

that keeps her up at night.  The cough is nonproductive.  And also worsening 

shortness of breath.  She presented to the emergency room for further 

evaluation and treatment.  Chest x-rays negative for any acute pulmonary 

process.  She started on IV Solu-Medrol and nebulizer treatments.  Patient 

reports that she did take a DuoNeb treatment at home with not much improvement.

  Patient was concerned about blood clots.  She has not missed any doses of her 

Xarelto.  She did complain of right leg tightness in the ER.  Doppler was 

completed and is negative for DVT.  She has been having issues with swelling in 

both legs and was started on Lasix by her family doctor.  Denies any history of 

congestive heart failure.  She ws admitted to the hospital for an acute COPD 

exacerbation and bronchitis.  Will place her on Levaquin.  She has an ALLERGY 

to penicillin.  Pulmonary service has been consulted.  Patient denies any fever 

or chills.  Denies a nausea vomiting.  Denies any chest pain.





Review of Systems





Please refer to HPI otherwise unremarkable





Past Medical History


Past Medical History: Chest Pain / Angina, Deep Vein Thrombosis (DVT), 

Hyperlipidemia, Pneumonia, Pulmonary Embolus (PE), Thyroid Disorder


Additional Past Medical History / Comment(s): PAPITATIONS, PE with residual 

clots, hypothyroid, Shingles 2018


History of Any Multi-Drug Resistant Organisms: None Reported


Past Surgical History:  Section, Cholecystectomy, Hernia Repair, 

Orthopedic Surgery, Tonsillectomy


Additional Past Surgical History / Comment(s): ONE DENTAL IMPLANT, COLONOSCOPY,

HEMORRHOIDECTOMY,PILONIDAL CYST REMOVED AGE 17, CERVICAL BX-PRE CANCEROUS CELLS-

HAD PROCEDURE , HAD BROKEN IUD REMOVED AND PT STATED THEY WENT IN LIKE HAVING A 

 TO REMOVED BROKEN PEICES,DONNA KNEE REPLACEMENTS


Past Anesthesia/Blood Transfusion Reactions: No Reported Reaction


Past Psychological History: No Psychological Hx Reported


Smoking Status: Former smoker


Past Alcohol Use History: Occasional


Past Drug Use History: None Reported





- Past Family History


  ** Father


Family Medical History: Congestive Heart Failure (CHF)


Additional Family Medical History / Comment(s):  AGE 78





  ** Mother


Additional Family Medical History / Comment(s): PARKINSONS- AGE 74.     AN 

AUNT HAD BRAIN AND ABD ANUERYSM.





Medications and Allergies


 Home Medications











 Medication  Instructions  Recorded  Confirmed  Type


 


Levothyroxine Sodium [Synthroid] 150 mcg PO DAILY 02/10/17 09/25/18 History


 


Atorvastatin [Lipitor] 40 mg PO HS 17 History


 


Furosemide [Lasix] 20 mg PO DAILY 17 History


 


Metoprolol Tartrate 25 mg PO DAILY 17 History


 


Cholecalciferol [Vitamin D3] 3,000 unit PO DAILY 18 History


 


Rivaroxaban [Xarelto] 20 mg PO DAILY 18 History











 Allergies











Allergy/AdvReac Type Severity Reaction Status Date / Time


 


Penicillins Allergy  Swelling Verified 18 07:32














Physical Exam


Vitals: 


 Vital Signs











  Temp Pulse Pulse Resp BP BP Pulse Ox


 


 18 12:20  98.4 F   77  16   146/79  96


 


 18 11:40  98 F  78   20  134/63   98


 


 18 10:26   68     


 


 18 10:15   68     


 


 18 09:42     18   


 


 18 07:54   68     


 


 18 07:35   64     


 


 18 07:18  97.9 F  69   18  138/82   98








 Intake and Output











 18





 22:59 06:59 14:59


 


Other:   


 


  # Voids   1


 


  Weight   127 kg














Head normocephalic


Neck supple


Lungs few coarse breath sounds noted anteriorly.  Mild expiratory wheezing 

noted.  No crackles


Heart regular rate and rhythm S1-S2, no rub or gallop


Abdomen is soft nontender nondistended positive bowel sounds no 

hepatosplenomegaly.  Obesity


Extremities no edema


Neuro alert and orientated to 3





Results


CBC & Chem 7: 


 18 07:50





 18 07:50


Labs: 


 Abnormal Lab Results - Last 24 Hours (Table)











  18 Range/Units





  07:50 


 


Glucose  108 H  (74-99)  mg/dL


 


AST  48 H  (14-36)  U/L


 


ALT  61 H  (9-52)  U/L














Thrombosis Risk Factor Assmnt





- Choose All That Apply


Each Factor Represents 1 point: Abnormal pulmonary function (COPD), Obesity (

BMI >25), Swollen legs (current)


Each Risk Factor Represents 2 Points: Age 61-74 years


Each Risk Factor Represents 3 Points: Family history of DVT/PE, History of DVT/

PE


Thrombosis Risk Factor Assessment Total Risk Factor Score: 11


Thrombosis Risk Factor Assessment Level: High Risk





Assessment and Plan


Assessment: 





1.  Shortness of breath with cough and congestion.  Possibly related to COPD 

exacerbation and bronchitis.  However, concerns for a possible PE due to her 

recent traveling to [place].  Check d-dimer.  Continue with her Xarelto.  

Pulmonary service has been consulted





2.  Acute COPD exacerbation: Continue DuoNeb updrafts and IV Solu-Medrol.





3.  Acute tracheobronchitis: No pneumonia on chest x-ray.  Patient started on 

Levaquin.  She has a ALLERGY to penicillin.  Will add Mucinex also for her 

congestion





4.  History of PE and DVT diagnosed in 2016.  Continue Xarelto.  Doppler of 

right leg and this admission negative for DVT





5.  Hyperlipidemia statin on hold due to mildly elevated LFTs





6.  Mildly elevated LFTs: Repeat LFTs in a.m.  Hold statin





7.  Hypothyroidism continue Synthroid





8.  Oral candidiasis start nystatin swish and swallow





GI prophylaxis Pepcid and DVT prophylaxis Xarelto

















Time with Patient: Greater than 30 (Greater than 60% of the total time spent in 

counseling and coordination of care.I performed an examination of the patient 

and discussed their management with the physician Assistant.  I have reviewed 

the Physician Assistant's notes and agree with the documented findings and plan 

of care)

## 2018-09-25 NOTE — ED
General Adult HPI





- General


Source: patient, RN notes reviewed


Mode of arrival: ambulatory


Limitations: no limitations





<Andrea Ken - Last Filed: 18 10:47>





<Vasiliy Brush - Last Filed: 18 11:11>





- General


Chief complaint: Upper Respiratory Infection


Stated complaint: bronchitis


Time Seen by Provider: 18 07:24





- History of Present Illness


Initial comments: 





62-year-old female presents emergency Department chief complaint cough 

congestion since last Wednesday.  Patient states symptoms are progressively 

getting worse.  Patient states she just traveled back from [place].  Patient 

states that she did try albuterol treatment at home did not have much relief.  

Patient states that she does have a history of DVTs and she went to right calf 

pain though she currently takes Xarelto daily and has not missed any doses.  

She denies any pleuritic chest pain, chest pain, fever or chills.  She does not 

do mild shortness of breath though she states that she is wheezing.  Patient 

denies ear pain with complaints of mild sore throat.  Denies headache, dizziness

, neck pain. (Andrea Ken)





- Related Data


 Home Medications











 Medication  Instructions  Recorded  Confirmed


 


Levothyroxine Sodium [Synthroid] 150 mcg PO DAILY 02/10/17 09/25/18


 


Atorvastatin [Lipitor] 40 mg PO HS 17


 


Furosemide [Lasix] 20 mg PO DAILY 17


 


Metoprolol Tartrate 25 mg PO DAILY 17


 


Cholecalciferol [Vitamin D3] 3,000 unit PO DAILY 18


 


Rivaroxaban [Xarelto] 20 mg PO DAILY 18











 Allergies











Allergy/AdvReac Type Severity Reaction Status Date / Time


 


Penicillins Allergy  Swelling Verified 18 07:32














Review of Systems


ROS Other: All systems not noted in ROS Statement are negative.





<Andrea Ken - Last Filed: 18 10:47>


ROS Other: All systems not noted in ROS Statement are negative.





<Vasiliy Brush - Last Filed: 18 11:11>


ROS Statement: 


Those systems with pertinent positive or pertinent negative responses have been 

documented in the HPI.








Past Medical History


Past Medical History: Chest Pain / Angina, Hyperlipidemia, Pneumonia, Pulmonary 

Embolus (PE), Thyroid Disorder


Additional Past Medical History / Comment(s): PAPITATIONS, PE with residual 

clots


History of Any Multi-Drug Resistant Organisms: None Reported


Past Surgical History:  Section, Cholecystectomy, Hernia Repair, 

Orthopedic Surgery, Tonsillectomy


Additional Past Surgical History / Comment(s): ONE DENTAL IMPLANT, COLONOSCOPY,

HEMORRHOIDECTOMY,PILONIDAL CYST REMOVED AGE 17, CERVICAL BX-PRE CANCEROUS CELLS-

HAD PROCEDURE , HAD BROKEN IUD REMOVED AND PT STATED THEY WENT IN LIKE HAVING A 

 TO REMOVED BROKEN PEICES,DONNA KNEE REPLACEMENTS


Past Anesthesia/Blood Transfusion Reactions: No Reported Reaction


Past Psychological History: No Psychological Hx Reported


Smoking Status: Former smoker


Past Alcohol Use History: Rare


Past Drug Use History: None Reported





- Past Family History


  ** Father


Family Medical History: Congestive Heart Failure (CHF)


Additional Family Medical History / Comment(s):  AGE 78





  ** Mother


Additional Family Medical History / Comment(s): PARKINSONS- AGE 74.     AN 

AUNT HAD BRAIN AND ABD ANUERYSM.





<Andrea Ken M - Last Filed: 18 10:47>





General Exam


Limitations: no limitations


General appearance: alert, in no apparent distress


Head exam: Present: atraumatic, normocephalic, normal inspection


Eye exam: Present: normal appearance, PERRL, EOMI.  Absent: scleral icterus, 

conjunctival injection, periorbital swelling


ENT exam: Present: normal exam, normal oropharynx, mucous membranes moist, TM's 

normal bilaterally, normal external ear exam


Neck exam: Present: normal inspection, full ROM.  Absent: tenderness, 

meningismus, lymphadenopathy


Respiratory exam: Present: wheezes.  Absent: normal lung sounds bilaterally, 

respiratory distress, rales, rhonchi, stridor


Cardiovascular Exam: Present: regular rate, normal rhythm, normal heart sounds.

  Absent: systolic murmur, diastolic murmur, rubs, gallop, clicks


Extremities exam: Present: normal capillary refill.  Absent: pedal edema, calf 

tenderness


Skin exam: Present: warm, dry, intact, normal color.  Absent: rash





<Andrea Ken M - Last Filed: 18 10:47>





 Vital Signs











  18





  07:18 07:35 07:54


 


Temperature 97.9 F  


 


Pulse Rate 69 64 68


 


Respiratory 18  





Rate   


 


Blood Pressure 138/82  


 


O2 Sat by Pulse 98  





Oximetry   














  18





  09:42 10:15 10:26


 


Temperature   


 


Pulse Rate  68 68


 


Respiratory 18  





Rate   


 


Blood Pressure   


 


O2 Sat by Pulse   





Oximetry   














Medical Decision Making





- Lab Data


Result diagrams: 


 18 07:50





 18 07:50





<Andrea Ken - Last Filed: 18 10:47>





- Lab Data


Result diagrams: 


 18 07:50





 18 07:50





<Vasiliy Brush - Last Filed: 18 11:11>





- Medical Decision Making





62-year-old female presents emergency from for cough and cold like symptoms.  

Patient has continued bronchospasm, dyspnea after 3 updrafts.  Patient was 

given Solu-Medrol.  Patient be admitted for COPD exacerbation. (Andrea Ken)





Patient reevaluated by myself, Dr. Brush.  Patient resting comfortably in bed.  

Patient still does have some wheezing.  Patient updated on results and plan.  

Case was discussed in detail with Dr. Baez, who will admit for Dr. vogt. (

Vasiliy Brush)





- Lab Data





 Lab Results











  18 Range/Units





  07:50 07:50 07:50 


 


WBC   5.1   (3.8-10.6)  k/uL


 


RBC   4.58   (3.80-5.40)  m/uL


 


Hgb   13.8   (11.4-16.0)  gm/dL


 


Hct   43.2   (34.0-46.0)  %


 


MCV   94.3   (80.0-100.0)  fL


 


MCH   30.0   (25.0-35.0)  pg


 


MCHC   31.9   (31.0-37.0)  g/dL


 


RDW   13.7   (11.5-15.5)  %


 


Plt Count   263   (150-450)  k/uL


 


Neutrophils %   44   %


 


Lymphocytes %   40   %


 


Monocytes %   8   %


 


Eosinophils %   4   %


 


Basophils %   1   %


 


Neutrophils #   2.2   (1.3-7.7)  k/uL


 


Lymphocytes #   2.1   (1.0-4.8)  k/uL


 


Monocytes #   0.4   (0-1.0)  k/uL


 


Eosinophils #   0.2   (0-0.7)  k/uL


 


Basophils #   0.1   (0-0.2)  k/uL


 


PT     (9.0-12.0)  sec


 


INR     (<1.2)  


 


APTT     (22.0-30.0)  sec


 


Sodium    142  (137-145)  mmol/L


 


Potassium    4.5  (3.5-5.1)  mmol/L


 


Chloride    107  ()  mmol/L


 


Carbon Dioxide    26  (22-30)  mmol/L


 


Anion Gap    9  mmol/L


 


BUN    9  (7-17)  mg/dL


 


Creatinine    0.74  (0.52-1.04)  mg/dL


 


Est GFR (CKD-EPI)AfAm    >90  (>60 ml/min/1.73 sqM)  


 


Est GFR (CKD-EPI)NonAf    87  (>60 ml/min/1.73 sqM)  


 


Glucose    108 H  (74-99)  mg/dL


 


Plasma Lactic Acid Anup  1.6    (0.7-2.0)  mmol/L


 


Calcium    9.7  (8.4-10.2)  mg/dL


 


Total Bilirubin    0.8  (0.2-1.3)  mg/dL


 


AST    48 H  (14-36)  U/L


 


ALT    61 H  (9-52)  U/L


 


Alkaline Phosphatase    56  ()  U/L


 


Total Protein    7.0  (6.3-8.2)  g/dL


 


Albumin    4.1  (3.5-5.0)  g/dL














  18 Range/Units





  07:50 


 


WBC   (3.8-10.6)  k/uL


 


RBC   (3.80-5.40)  m/uL


 


Hgb   (11.4-16.0)  gm/dL


 


Hct   (34.0-46.0)  %


 


MCV   (80.0-100.0)  fL


 


MCH   (25.0-35.0)  pg


 


MCHC   (31.0-37.0)  g/dL


 


RDW   (11.5-15.5)  %


 


Plt Count   (150-450)  k/uL


 


Neutrophils %   %


 


Lymphocytes %   %


 


Monocytes %   %


 


Eosinophils %   %


 


Basophils %   %


 


Neutrophils #   (1.3-7.7)  k/uL


 


Lymphocytes #   (1.0-4.8)  k/uL


 


Monocytes #   (0-1.0)  k/uL


 


Eosinophils #   (0-0.7)  k/uL


 


Basophils #   (0-0.2)  k/uL


 


PT  9.9  (9.0-12.0)  sec


 


INR  1.0  (<1.2)  


 


APTT  22.6  (22.0-30.0)  sec


 


Sodium   (137-145)  mmol/L


 


Potassium   (3.5-5.1)  mmol/L


 


Chloride   ()  mmol/L


 


Carbon Dioxide   (22-30)  mmol/L


 


Anion Gap   mmol/L


 


BUN   (7-17)  mg/dL


 


Creatinine   (0.52-1.04)  mg/dL


 


Est GFR (CKD-EPI)AfAm   (>60 ml/min/1.73 sqM)  


 


Est GFR (CKD-EPI)NonAf   (>60 ml/min/1.73 sqM)  


 


Glucose   (74-99)  mg/dL


 


Plasma Lactic Acid Anup   (0.7-2.0)  mmol/L


 


Calcium   (8.4-10.2)  mg/dL


 


Total Bilirubin   (0.2-1.3)  mg/dL


 


AST   (14-36)  U/L


 


ALT   (9-52)  U/L


 


Alkaline Phosphatase   ()  U/L


 


Total Protein   (6.3-8.2)  g/dL


 


Albumin   (3.5-5.0)  g/dL














Disposition





<Andrea Ken - Last Filed: 18 10:47>





<Vasiliy Brush - Last Filed: 18 11:11>


Clinical Impression: 


 COPD exacerbation, Dyspnea, Bronchitis





Disposition: ADMITTED AS IP TO THIS HOSP


Condition: Fair


Referrals: 


Elisha Mae MD [Primary Care Provider] - 1-2 days

## 2018-09-26 LAB
ALBUMIN SERPL-MCNC: 4.1 G/DL (ref 3.5–5)
ALP SERPL-CCNC: 59 U/L (ref 38–126)
ALT SERPL-CCNC: 48 U/L (ref 9–52)
ANION GAP SERPL CALC-SCNC: 11 MMOL/L
AST SERPL-CCNC: 30 U/L (ref 14–36)
BASOPHILS # BLD AUTO: 0 K/UL (ref 0–0.2)
BASOPHILS NFR BLD AUTO: 0 %
BUN SERPL-SCNC: 15 MG/DL (ref 7–17)
CALCIUM SPEC-MCNC: 9.9 MG/DL (ref 8.4–10.2)
CHLORIDE SERPL-SCNC: 109 MMOL/L (ref 98–107)
CO2 SERPL-SCNC: 20 MMOL/L (ref 22–30)
EOSINOPHIL # BLD AUTO: 0 K/UL (ref 0–0.7)
EOSINOPHIL NFR BLD AUTO: 0 %
ERYTHROCYTE [DISTWIDTH] IN BLOOD BY AUTOMATED COUNT: 4.45 M/UL (ref 3.8–5.4)
ERYTHROCYTE [DISTWIDTH] IN BLOOD: 13.6 % (ref 11.5–15.5)
GLUCOSE BLD-MCNC: 140 MG/DL (ref 75–99)
GLUCOSE BLD-MCNC: 154 MG/DL (ref 75–99)
GLUCOSE BLD-MCNC: 161 MG/DL (ref 75–99)
GLUCOSE BLD-MCNC: 235 MG/DL (ref 75–99)
GLUCOSE SERPL-MCNC: 153 MG/DL (ref 74–99)
HBA1C MFR BLD: 6 % (ref 4–6)
HCT VFR BLD AUTO: 42.4 % (ref 34–46)
HGB BLD-MCNC: 13.8 GM/DL (ref 11.4–16)
LYMPHOCYTES # SPEC AUTO: 1.3 K/UL (ref 1–4.8)
LYMPHOCYTES NFR SPEC AUTO: 15 %
MCH RBC QN AUTO: 31 PG (ref 25–35)
MCHC RBC AUTO-ENTMCNC: 32.5 G/DL (ref 31–37)
MCV RBC AUTO: 95.3 FL (ref 80–100)
MONOCYTES # BLD AUTO: 0.3 K/UL (ref 0–1)
MONOCYTES NFR BLD AUTO: 3 %
NEUTROPHILS # BLD AUTO: 7.2 K/UL (ref 1.3–7.7)
NEUTROPHILS NFR BLD AUTO: 81 %
PLATELET # BLD AUTO: 273 K/UL (ref 150–450)
POTASSIUM SERPL-SCNC: 4.5 MMOL/L (ref 3.5–5.1)
PROT SERPL-MCNC: 7.1 G/DL (ref 6.3–8.2)
SODIUM SERPL-SCNC: 140 MMOL/L (ref 137–145)
WBC # BLD AUTO: 8.9 K/UL (ref 3.8–10.6)

## 2018-09-26 RX ADMIN — METHYLPREDNISOLONE SODIUM SUCCINATE SCH MG: 125 INJECTION, POWDER, FOR SOLUTION INTRAMUSCULAR; INTRAVENOUS at 11:39

## 2018-09-26 RX ADMIN — FAMOTIDINE SCH MG: 20 TABLET, FILM COATED ORAL at 08:11

## 2018-09-26 RX ADMIN — INSULIN ASPART SCH UNIT: 100 INJECTION, SOLUTION INTRAVENOUS; SUBCUTANEOUS at 17:42

## 2018-09-26 RX ADMIN — FUROSEMIDE SCH MG: 20 TABLET ORAL at 08:11

## 2018-09-26 RX ADMIN — INSULIN ASPART SCH UNIT: 100 INJECTION, SOLUTION INTRAVENOUS; SUBCUTANEOUS at 22:35

## 2018-09-26 RX ADMIN — GUAIFENESIN AND CODEINE PHOSPHATE PRN ML: 10; 100 LIQUID ORAL at 03:01

## 2018-09-26 RX ADMIN — METHYLPREDNISOLONE SODIUM SUCCINATE SCH MG: 125 INJECTION, POWDER, FOR SOLUTION INTRAMUSCULAR; INTRAVENOUS at 17:42

## 2018-09-26 RX ADMIN — NYSTATIN SCH UNIT: 100000 SUSPENSION ORAL at 08:12

## 2018-09-26 RX ADMIN — NYSTATIN SCH UNIT: 100000 SUSPENSION ORAL at 17:42

## 2018-09-26 RX ADMIN — IPRATROPIUM BROMIDE AND ALBUTEROL SULFATE SCH ML: .5; 3 SOLUTION RESPIRATORY (INHALATION) at 19:22

## 2018-09-26 RX ADMIN — IPRATROPIUM BROMIDE AND ALBUTEROL SULFATE SCH ML: .5; 3 SOLUTION RESPIRATORY (INHALATION) at 07:46

## 2018-09-26 RX ADMIN — INSULIN ASPART SCH UNIT: 100 INJECTION, SOLUTION INTRAVENOUS; SUBCUTANEOUS at 08:10

## 2018-09-26 RX ADMIN — METOPROLOL TARTRATE SCH MG: 25 TABLET, FILM COATED ORAL at 08:11

## 2018-09-26 RX ADMIN — IPRATROPIUM BROMIDE AND ALBUTEROL SULFATE SCH ML: .5; 3 SOLUTION RESPIRATORY (INHALATION) at 16:00

## 2018-09-26 RX ADMIN — GUAIFENESIN AND CODEINE PHOSPHATE PRN ML: 10; 100 LIQUID ORAL at 22:35

## 2018-09-26 RX ADMIN — LEVOTHYROXINE SODIUM SCH MCG: 75 TABLET ORAL at 05:50

## 2018-09-26 RX ADMIN — Medication SCH UNIT: at 11:34

## 2018-09-26 RX ADMIN — INSULIN ASPART SCH UNIT: 100 INJECTION, SOLUTION INTRAVENOUS; SUBCUTANEOUS at 13:48

## 2018-09-26 RX ADMIN — IPRATROPIUM BROMIDE AND ALBUTEROL SULFATE SCH ML: .5; 3 SOLUTION RESPIRATORY (INHALATION) at 11:18

## 2018-09-26 RX ADMIN — NYSTATIN SCH UNIT: 100000 SUSPENSION ORAL at 22:35

## 2018-09-26 RX ADMIN — RIVAROXABAN SCH MG: 20 TABLET, FILM COATED ORAL at 08:12

## 2018-09-26 RX ADMIN — LEVOFLOXACIN SCH MG: 500 TABLET, FILM COATED ORAL at 13:48

## 2018-09-26 RX ADMIN — METHYLPREDNISOLONE SODIUM SUCCINATE SCH MG: 125 INJECTION, POWDER, FOR SOLUTION INTRAMUSCULAR; INTRAVENOUS at 05:50

## 2018-09-26 RX ADMIN — NYSTATIN SCH UNIT: 100000 SUSPENSION ORAL at 11:40

## 2018-09-26 RX ADMIN — GUAIFENESIN AND CODEINE PHOSPHATE PRN ML: 10; 100 LIQUID ORAL at 11:40

## 2018-09-26 NOTE — P.PN
Subjective


Progress Note Date: 09/26/18


This is a 62-year-old female, patient of Dr. Mae.  She has a known past 

medical history of left leg DVT and PE diagnosed in 2016 and she is 

anticoagulated with Xarelto.  She also has a history of hypothyroidism, COPD 

and hyperlipidemia.  She is a former smoker.  Patient reports that she has been 

having a cough with congestion and shortness of breath for almost a week.  She 

left denies states to travel to [place] on the 15th.  And while she was there 

developed congestion with runny nose also having sweats.  She's having a cough 

that keeps her up at night.  The cough is nonproductive.  And also worsening 

shortness of breath.  She presented to the emergency room for further 

evaluation and treatment.  Chest x-rays negative for any acute pulmonary 

process.  She started on IV Solu-Medrol and nebulizer treatments.  Patient 

reports that she did take a DuoNeb treatment at home with not much improvement.

  Patient was concerned about blood clots.  She has not missed any doses of her 

Xarelto.  She did complain of right leg tightness in the ER.  Doppler was 

completed and is negative for DVT.  She has been having issues with swelling in 

both legs and was started on Lasix by her family doctor.  Denies any history of 

congestive heart failure.  She ws admitted to the hospital for an acute COPD 

exacerbation and bronchitis.  Will place her on Levaquin.  She has an ALLERGY 

to penicillin.  Pulmonary service has been consulted.  Patient denies any fever 

or chills.  Denies a nausea vomiting.  Denies any chest pain.





On 09/26/2018 patient states she is feeling slightly improved.  Patient remains 

on IV Solu-Medrol and Levaquin IV antibiotic.  Patient denies chest pain.  

Patient states shortness of breath is improving.  Patient is having productive 

cough.  Patient denies nausea vomiting or diarrhea.  Patient denies any urinary 

burning or frequency.











Objective





- Vital Signs


Vital signs: 


 Vital Signs











Temp  98.4 F   09/26/18 06:36


 


Pulse  70   09/26/18 11:32


 


Resp  18   09/26/18 06:36


 


BP  109/66   09/26/18 06:36


 


Pulse Ox  94 L  09/26/18 07:46








 Intake & Output











 09/25/18 09/26/18 09/26/18





 18:59 06:59 18:59


 


Weight 127 kg  


 


Other:   


 


  # Voids 1 1 














- Exam


Head normocephalic


Neck supple


Lungs few coarse breath sounds noted anteriorly..  Mild expiratory wheezing 

noted.  No crackles


Heart regular rate and rhythm S1-S2, no rub or gallop


Abdomen is soft nontender nondistended positive bowel sounds no 

hepatosplenomegaly


Extremities no edema


Neuro alert and orientated to 3








- Labs


CBC & Chem 7: 


 09/26/18 08:20





 09/26/18 08:20


Labs: 


 Abnormal Lab Results - Last 24 Hours (Table)











  09/25/18 09/25/18 09/26/18 Range/Units





  16:58 20:50 07:15 


 


Chloride     ()  mmol/L


 


Carbon Dioxide     (22-30)  mmol/L


 


Glucose     (74-99)  mg/dL


 


POC Glucose (mg/dL)  207 H  162 H  140 H  (75-99)  mg/dL














  09/26/18 09/26/18 Range/Units





  08:20 11:56 


 


Chloride  109 H   ()  mmol/L


 


Carbon Dioxide  20 L   (22-30)  mmol/L


 


Glucose  153 H   (74-99)  mg/dL


 


POC Glucose (mg/dL)   235 H  (75-99)  mg/dL








 Microbiology - Last 24 Hours (Table)











 09/25/18 07:50 Blood Culture - Preliminary





 Blood    No Growth after 24 hours














Assessment and Plan


Assessment: 





1.  Shortness of breath with cough and congestion.  Possibly related to COPD 

exacerbation and bronchitis.  However, concerns for a possible PE due to her 

recent traveling to [place].  D-dimer negative.  Continue with her Xarelto.  

Per pulmonary continue with updrafts, oral antibiotic and steroids at this time.





2.  Acute COPD exacerbation: Continue DuoNeb updrafts and IV Solu-Medrol.





3.  Acute tracheobronchitis: No pneumonia on chest x-ray.  Patient started on 

Levaquin.  She has a ALLERGY to penicillin.  Will add Mucinex also for her 

congestion





4.  History of PE and DVT diagnosed in 2016.  Continue Xarelto.  Doppler of 

right leg and this admission negative for DVT





5.  Hyperlipidemia statin on hold due to mildly elevated LFTs





6.  Mildly elevated LFTs: Repeat LFTs in a.m.  Hold statin





7.  Hypothyroidism continue Synthroid





8.  Oral candidiasis start nystatin swish and swallow





GI prophylaxis Pepcid and DVT prophylaxis Xarelto





I performed an examination of the patient and discussed their management with 

the Nurse Practitioner.  I have reviewed the Nurse Practitioner's notes and 

agree with the documented findings and plan of care

## 2018-09-26 NOTE — P.CNPUL
History of Present Illness


Consult date: 18


Reason for consult: dyspnea, cough, pulmonary embolism


Chief complaint: Cough shortness of breath upper respiratory tract infection


History of present illness: 





Pulmonary consult dated 2018





62-year-old female who presents to the emergency department with complaints of 

cough congestion shortness of breath.  She apparently was recently in [place].  

She apparently got sick there.  It started off as an upper respiratory tract 

infection and this settled into her chest.  She had chest congestion coughing 

wheezing and shortness of breath.  She does have a history of DVT and previous 

pulmonary embolism.  She is on lifelong blood thinner.  She is feeling better 

today.  She denies any chest pain or chest discomfort.  There was no fever or 

chills.  No nausea vomiting or diarrhea.  She does not have a history of 

underlying intrinsic pulmonary disease.  She was last seen by me in 2018.  Pulmonary function testing was normal.  She is feeling better today and 

probably could be discharged home later today or tomorrow.  Her likely 

diagnosis is acute bronchitis with reactive bronchospasm and bronchial 

inflammation.  Her chest x-ray did not show an acute process.  In addition, she 

did have a Doppler of the right lower extremity which was negative.  She does 

have a history of angina, hyperlipidemia, pneumonia, pulmonary embolism, 

hypothyroidism, hyperlipidemia, and a DVT.





Review of Systems





A 14 point review of systems is positive for chest congestion coughing wheezing 

phlegm production and shortness of breath.  She is feeling better today.  She 

became ill while she was visiting in [place].





Past Medical History


Past Medical History: Chest Pain / Angina, Deep Vein Thrombosis (DVT), 

Hyperlipidemia, Pneumonia, Pulmonary Embolus (PE), Thyroid Disorder


Additional Past Medical History / Comment(s): PAPITATIONS, PE with residual 

clots, hypothyroid, Shingles 


History of Any Multi-Drug Resistant Organisms: None Reported


Past Surgical History:  Section, Cholecystectomy, Hernia Repair, 

Orthopedic Surgery, Tonsillectomy


Additional Past Surgical History / Comment(s): ONE DENTAL IMPLANT, COLONOSCOPY,

HEMORRHOIDECTOMY,PILONIDAL CYST REMOVED AGE 17, CERVICAL BX-PRE CANCEROUS CELLS-

HAD PROCEDURE , HAD BROKEN IUD REMOVED AND PT STATED THEY WENT IN LIKE HAVING A 

 TO REMOVED BROKEN PEICES,DONNA KNEE REPLACEMENTS


Past Anesthesia/Blood Transfusion Reactions: No Reported Reaction


Past Psychological History: No Psychological Hx Reported


Smoking Status: Former smoker


Past Alcohol Use History: Occasional


Past Drug Use History: None Reported





- Past Family History


  ** Father


Family Medical History: Congestive Heart Failure (CHF)


Additional Family Medical History / Comment(s):  AGE 78





  ** Mother


Additional Family Medical History / Comment(s): PARKINSONS- AGE 74.     AN 

AUNT HAD BRAIN AND ABD ANUERYSM.





Medications and Allergies


 Home Medications











 Medication  Instructions  Recorded  Confirmed  Type


 


Levothyroxine Sodium [Synthroid] 150 mcg PO DAILY 02/10/17 09/25/18 History


 


Atorvastatin [Lipitor] 40 mg PO HS 17 History


 


Furosemide [Lasix] 20 mg PO DAILY 17 History


 


Metoprolol Tartrate 25 mg PO DAILY 17 History


 


Cholecalciferol [Vitamin D3] 3,000 unit PO DAILY 18 History


 


Rivaroxaban [Xarelto] 20 mg PO DAILY 18 History











 Allergies











Allergy/AdvReac Type Severity Reaction Status Date / Time


 


Penicillins Allergy  Swelling Verified 18 07:32














Physical Exam


Osteopathic Statement: *.  No significant issues noted on an osteopathic 

structural exam other than those noted in the History and Physical/Consult.


Vitals: 


 Vital Signs











  Temp Pulse Pulse Resp BP BP Pulse Ox


 


 18 11:32   70     


 


 18 11:20   72     


 


 18 07:59   72     


 


 18 07:46   70      94 L


 


 18 06:36  98.4 F   67  18   109/66  96


 


 18 23:00  98.5 F   75  18   116/67  98


 


 18 19:39   78     


 


 18 19:19   78     


 


 18 15:47   78     


 


 18 15:36   78     


 


 18 15:00  98.4 F   93  20   135/72  96


 


 18 12:20  98.4 F   77  16   146/79  96


 


 18 11:40  98 F  78   20  134/63   98








 Intake and Output











 18





 22:59 06:59 14:59


 


Other:   


 


  # Voids 1 1 














No acute distress, oriented 3.  Not requiring any supplemental oxygen.





HEENT examination is grossly unremarkable.  Mucous membranes are moist.  No 

oral lesions.





Neck supple.  Full range of motion.  No adenopathy thyromegaly or neck vein 

distention.





Cardiovascular examination reveals regular rhythm rate.  S1-S2 normal.  No S3 

or S4.  No discernible murmur noted.  Heart sounds are distant heart rate is 80 

bpm.





Lungs reveal mild bilateral expiratory rhonchi.  Slight prolongation on forced 

maneuver.  No crackles.  Mild expiratory wheezes.  Breath sounds equal 

bilaterally.





Abdomen soft bowel sounds are heard.  No masses or tenderness.





Extremities are intact.  No cyanosis or clubbing.  Mild lower extremity edema.  





Skin is without rash or lesion.





Neurologic examination is brief but nonfocal.





Results





- Laboratory Findings


CBC and BMP: 


 18 08:20





 18 08:20


PT/INR, D-dimer











PT  9.9 sec (9.0-12.0)   18  07:50    


 


INR  1.0  (<1.2)   18  07:50    


 


D-Dimer  0.35 mg/L FEU (<0.60)   18  15:12    








Abnormal lab findings: 


 Abnormal Labs











  18





  07:50 16:58 20:50


 


Chloride   


 


Carbon Dioxide   


 


Glucose  108 H  


 


POC Glucose (mg/dL)   207 H  162 H


 


AST  48 H  


 


ALT  61 H  














  18





  07:15 08:20


 


Chloride   109 H


 


Carbon Dioxide   20 L


 


Glucose   153 H


 


POC Glucose (mg/dL)  140 H 


 


AST  


 


ALT  














- Diagnostic Findings


Chest x-ray: report reviewed, image reviewed


U/S of Legs: report reviewed (Chest x-ray labs and medications are reviewed.), 

image reviewed





Assessment and Plan


Assessment: 





Assessment





Acute bronchitis with reactive bronchospasm and bronchial inflammation





Recent acute upper respiratory tract infection





History of DVT and pulmonary embolism, currently on lifelong anticoagulation





History of hyperlipidemia





History of pneumonia





History of hypothyroidism





Obesity





No history of intrinsic pulmonary disease








Plan: 





Plan dated 2018





The patient is currently on updrafts with all be a role.  She is also receiving 

Atrovent or ipratropium bromide.  She is currently on a oral antibiotic.  In 

addition, she is receiving some steroids.  I'm sure that one day or so, she'll 

be ready for discharge.  His acute bronchitis with reactive bronchospasm and 

bronchial inflammation.  The patient does not have any intrinsic pulmonary 

disease.  She does have a history of recurrent pulmonary embolism for which she 

is on lifelong anticoagulation.  Additional recommendations and suggestions are 

forthcoming.  Prognosis is guarded.  CBC is normal.  Sodium potassium are 

normal.  Chloride 109 CO2 20 anion gap is normal.  BUN and creatinine were 15 

and 0.78.


Time with Patient: Greater than 30

## 2018-09-27 VITALS — RESPIRATION RATE: 16 BRPM

## 2018-09-27 LAB
ALBUMIN SERPL-MCNC: 3.8 G/DL (ref 3.5–5)
ALP SERPL-CCNC: 52 U/L (ref 38–126)
ALT SERPL-CCNC: 43 U/L (ref 9–52)
ANION GAP SERPL CALC-SCNC: 8 MMOL/L
AST SERPL-CCNC: 24 U/L (ref 14–36)
BASOPHILS # BLD AUTO: 0 K/UL (ref 0–0.2)
BASOPHILS NFR BLD AUTO: 0 %
BUN SERPL-SCNC: 21 MG/DL (ref 7–17)
CALCIUM SPEC-MCNC: 9.5 MG/DL (ref 8.4–10.2)
CHLORIDE SERPL-SCNC: 108 MMOL/L (ref 98–107)
CO2 SERPL-SCNC: 24 MMOL/L (ref 22–30)
EOSINOPHIL # BLD AUTO: 0 K/UL (ref 0–0.7)
EOSINOPHIL NFR BLD AUTO: 0 %
ERYTHROCYTE [DISTWIDTH] IN BLOOD BY AUTOMATED COUNT: 4.35 M/UL (ref 3.8–5.4)
ERYTHROCYTE [DISTWIDTH] IN BLOOD: 13.6 % (ref 11.5–15.5)
GLUCOSE BLD-MCNC: 146 MG/DL (ref 75–99)
GLUCOSE BLD-MCNC: 160 MG/DL (ref 75–99)
GLUCOSE BLD-MCNC: 170 MG/DL (ref 75–99)
GLUCOSE BLD-MCNC: 176 MG/DL (ref 75–99)
GLUCOSE SERPL-MCNC: 151 MG/DL (ref 74–99)
HCT VFR BLD AUTO: 41.5 % (ref 34–46)
HGB BLD-MCNC: 12.9 GM/DL (ref 11.4–16)
LYMPHOCYTES # SPEC AUTO: 1.2 K/UL (ref 1–4.8)
LYMPHOCYTES NFR SPEC AUTO: 10 %
MCH RBC QN AUTO: 29.6 PG (ref 25–35)
MCHC RBC AUTO-ENTMCNC: 31 G/DL (ref 31–37)
MCV RBC AUTO: 95.4 FL (ref 80–100)
MONOCYTES # BLD AUTO: 0.5 K/UL (ref 0–1)
MONOCYTES NFR BLD AUTO: 4 %
NEUTROPHILS # BLD AUTO: 10.1 K/UL (ref 1.3–7.7)
NEUTROPHILS NFR BLD AUTO: 84 %
PLATELET # BLD AUTO: 279 K/UL (ref 150–450)
POTASSIUM SERPL-SCNC: 4.8 MMOL/L (ref 3.5–5.1)
PROT SERPL-MCNC: 6.4 G/DL (ref 6.3–8.2)
SODIUM SERPL-SCNC: 140 MMOL/L (ref 137–145)
WBC # BLD AUTO: 11.9 K/UL (ref 3.8–10.6)

## 2018-09-27 RX ADMIN — GUAIFENESIN AND CODEINE PHOSPHATE PRN ML: 10; 100 LIQUID ORAL at 21:49

## 2018-09-27 RX ADMIN — METHYLPREDNISOLONE SODIUM SUCCINATE SCH MG: 125 INJECTION, POWDER, FOR SOLUTION INTRAMUSCULAR; INTRAVENOUS at 12:53

## 2018-09-27 RX ADMIN — FAMOTIDINE SCH MG: 20 TABLET, FILM COATED ORAL at 08:12

## 2018-09-27 RX ADMIN — FUROSEMIDE SCH MG: 20 TABLET ORAL at 08:12

## 2018-09-27 RX ADMIN — NYSTATIN SCH UNIT: 100000 SUSPENSION ORAL at 17:35

## 2018-09-27 RX ADMIN — IPRATROPIUM BROMIDE AND ALBUTEROL SULFATE SCH ML: .5; 3 SOLUTION RESPIRATORY (INHALATION) at 07:17

## 2018-09-27 RX ADMIN — INSULIN ASPART SCH UNIT: 100 INJECTION, SOLUTION INTRAVENOUS; SUBCUTANEOUS at 17:34

## 2018-09-27 RX ADMIN — RIVAROXABAN SCH MG: 20 TABLET, FILM COATED ORAL at 08:12

## 2018-09-27 RX ADMIN — IPRATROPIUM BROMIDE AND ALBUTEROL SULFATE SCH ML: .5; 3 SOLUTION RESPIRATORY (INHALATION) at 12:31

## 2018-09-27 RX ADMIN — LEVOTHYROXINE SODIUM SCH MCG: 75 TABLET ORAL at 06:13

## 2018-09-27 RX ADMIN — NYSTATIN SCH UNIT: 100000 SUSPENSION ORAL at 21:47

## 2018-09-27 RX ADMIN — NYSTATIN SCH UNIT: 100000 SUSPENSION ORAL at 08:12

## 2018-09-27 RX ADMIN — METHYLPREDNISOLONE SODIUM SUCCINATE SCH MG: 125 INJECTION, POWDER, FOR SOLUTION INTRAMUSCULAR; INTRAVENOUS at 00:09

## 2018-09-27 RX ADMIN — METOPROLOL TARTRATE SCH MG: 25 TABLET, FILM COATED ORAL at 08:12

## 2018-09-27 RX ADMIN — Medication SCH UNIT: at 08:12

## 2018-09-27 RX ADMIN — INSULIN ASPART SCH UNIT: 100 INJECTION, SOLUTION INTRAVENOUS; SUBCUTANEOUS at 12:53

## 2018-09-27 RX ADMIN — METHYLPREDNISOLONE SODIUM SUCCINATE SCH MG: 125 INJECTION, POWDER, FOR SOLUTION INTRAMUSCULAR; INTRAVENOUS at 06:27

## 2018-09-27 RX ADMIN — NYSTATIN SCH UNIT: 100000 SUSPENSION ORAL at 12:53

## 2018-09-27 RX ADMIN — LEVOFLOXACIN SCH MG: 500 TABLET, FILM COATED ORAL at 16:51

## 2018-09-27 RX ADMIN — IPRATROPIUM BROMIDE AND ALBUTEROL SULFATE SCH ML: .5; 3 SOLUTION RESPIRATORY (INHALATION) at 15:25

## 2018-09-27 RX ADMIN — INSULIN ASPART SCH UNIT: 100 INJECTION, SOLUTION INTRAVENOUS; SUBCUTANEOUS at 08:12

## 2018-09-27 RX ADMIN — INSULIN ASPART SCH UNIT: 100 INJECTION, SOLUTION INTRAVENOUS; SUBCUTANEOUS at 21:46

## 2018-09-27 RX ADMIN — METHYLPREDNISOLONE SODIUM SUCCINATE SCH MG: 125 INJECTION, POWDER, FOR SOLUTION INTRAMUSCULAR; INTRAVENOUS at 17:34

## 2018-09-27 RX ADMIN — IPRATROPIUM BROMIDE AND ALBUTEROL SULFATE SCH ML: .5; 3 SOLUTION RESPIRATORY (INHALATION) at 19:24

## 2018-09-27 NOTE — P.PN
Subjective


Progress Note Date: 09/27/18


Principal diagnosis: 


Acute bronchitis with reactive bronchospasm and bronchial inflammation.





Pulmonary consult dated 09/26/2018





62-year-old female who presents to the emergency department with complaints of 

cough congestion shortness of breath.  She apparently was recently in [place].  

She apparently got sick there.  It started off as an upper respiratory tract 

infection and this settled into her chest.  She had chest congestion coughing 

wheezing and shortness of breath.  She does have a history of DVT and previous 

pulmonary embolism.  She is on lifelong blood thinner.  She is feeling better 

today.  She denies any chest pain or chest discomfort.  There was no fever or 

chills.  No nausea vomiting or diarrhea.  She does not have a history of 

underlying intrinsic pulmonary disease.  She was last seen by me in February 2018.  Pulmonary function testing was normal.  She is feeling better today and 

probably could be discharged home later today or tomorrow.  Her likely 

diagnosis is acute bronchitis with reactive bronchospasm and bronchial 

inflammation.  Her chest x-ray did not show an acute process.  In addition, she 

did have a Doppler of the right lower extremity which was negative.  She does 

have a history of angina, hyperlipidemia, pneumonia, pulmonary embolism, 

hypothyroidism, hyperlipidemia, and a DVT. 





On 09/27/2018 patient seen in follow-up medical surgical floor.  Doing much 

better today, no shortness of breath, coughing less.  Lung sounds are positive 

for a few residual wheezes, but overall patient is clinically improved.  

Afebrile, vital signs are stable.  He has been treated with IV steroids, 

nebulized bronchodilators, and antibiotics, and has responded well to 

treatments.  Requesting to go home today.











Objective





- Vital Signs


Vital signs: 


 Vital Signs











Temp  98.9 F   09/27/18 07:00


 


Pulse  76   09/27/18 07:27


 


Resp  16   09/27/18 07:00


 


BP  119/58   09/27/18 07:00


 


Pulse Ox  93 L  09/27/18 07:00








 Intake & Output











 09/26/18 09/27/18 09/27/18





 18:59 06:59 18:59


 


Other:   


 


  # Voids 2 2 














- Exam


GENERAL EXAM: Alert, pleasant, 62-year-old white female comfortable in no 

apparent distress.


HEAD: Normocephalic/atraumatic.


EYES: Normal reaction of pupils, equal size.  Conjunctiva pink, sclera white.


NOSE: Clear with pink turbinates.


THROAT: No erythema or exudates.


NECK: No masses, no JVD, no thyroid enlargement, no adenopathy.


CHEST: No chest wall deformity.  Symmetrical expansion. 


LUNGS: Equal air entry with no crackles, wheeze, rhonchi or dullness.


CVS: Regular rate and rhythm, normal S1 and S2, no gallops, no murmurs, no rubs


ABDOMEN: Soft, nontender.  No hepatosplenomegaly, normal bowel sounds, no 

guarding or rigidity.


EXTREMITIES: No clubbing, no edema, no cyanosis, 2+ pulses and upper and lower 

extremities.


MUSCULOSKELETAL: Muscle strength and tone normal.


SPINE: No scoliosis or deformity


SKIN: No rashes


CENTRAL NERVOUS SYSTEM: Alert and oriented -3.  No focal deficits, tone is 

normal in all 4 extremities.


PSYCHIATRIC: Alert and oriented -3.  Appropriate affect.  Intact judgment and 

insight.











- Labs


CBC & Chem 7: 


 09/27/18 07:55





 09/27/18 07:55


Labs: 


 Abnormal Lab Results - Last 24 Hours (Table)











  09/26/18 09/26/18 09/26/18 Range/Units





  11:56 17:05 22:15 


 


WBC     (3.8-10.6)  k/uL


 


Neutrophils #     (1.3-7.7)  k/uL


 


Chloride     ()  mmol/L


 


BUN     (7-17)  mg/dL


 


Glucose     (74-99)  mg/dL


 


POC Glucose (mg/dL)  235 H  154 H  161 H  (75-99)  mg/dL














  09/27/18 09/27/18 09/27/18 Range/Units





  07:54 07:55 07:55 


 


WBC   11.9 H   (3.8-10.6)  k/uL


 


Neutrophils #   10.1 H   (1.3-7.7)  k/uL


 


Chloride    108 H  ()  mmol/L


 


BUN    21 H  (7-17)  mg/dL


 


Glucose    151 H  (74-99)  mg/dL


 


POC Glucose (mg/dL)  146 H    (75-99)  mg/dL








 Microbiology - Last 24 Hours (Table)











 09/25/18 07:50 Blood Culture - Preliminary





 Blood    No Growth after 48 hours














Assessment and Plan


Plan: 


Acute bronchitis with reactive bronchospasm and bronchial inflammation





Recent acute upper respiratory tract infection





History of DVT and pulmonary embolism, currently on lifelong anticoagulation





History of hyperlipidemia





History of pneumonia





History of hypothyroidism





Obesity





No history of intrinsic pulmonary disease





Plan:





Patient is doing well, continues to improve, no acute issues overnight, 

breathing easier today.  Afebrile.  From pulmonary perspective patient can be 

discharged home today, on the short course of oral antibiotics, penicillin taper

, and DuoNeb nebulized treatments.  Follow-up with Dr. Loomis in the office in 

one week





I performed a history & physical examination of the patient and discussed their 

management with my nurse practitioner, Marilou Arroyo.  I reviewed the nurse 

practitioner's note and agree with the documented findings and plan of care.  

Lung sounds are positive for a few wheezes.  The findings and the impression 

was discussed with the patient.  I attest to the documentation by the nurse 

practitioner. 





Time with Patient: Less than 30

## 2018-09-27 NOTE — P.PN
Subjective


Progress Note Date: 09/27/18





This is a 62-year-old female, patient of Dr. Mae.  She has a known past 

medical history of left leg DVT and PE diagnosed in 2016 and she is 

anticoagulated with Xarelto.  She also has a history of hypothyroidism, COPD 

and hyperlipidemia.  She is a former smoker.  Patient reports that she has been 

having a cough with congestion and shortness of breath for almost a week.  She 

left denies states to travel to [place] on the 15th.  And while she was there 

developed congestion with runny nose also having sweats.  She's having a cough 

that keeps her up at night.  The cough is nonproductive.  And also worsening 

shortness of breath.  She presented to the emergency room for further 

evaluation and treatment.  Chest x-rays negative for any acute pulmonary 

process.  She started on IV Solu-Medrol and nebulizer treatments.  Patient 

reports that she did take a DuoNeb treatment at home with not much improvement.

  Patient was concerned about blood clots.  She has not missed any doses of her 

Xarelto.  She did complain of right leg tightness in the ER.  Doppler was 

completed and is negative for DVT.  She has been having issues with swelling in 

both legs and was started on Lasix by her family doctor.  Denies any history of 

congestive heart failure.  She ws admitted to the hospital for an acute COPD 

exacerbation and bronchitis.  Will place her on Levaquin.  She has an ALLERGY 

to penicillin.  Pulmonary service has been consulted.  Patient denies any fever 

or chills.  Denies a nausea vomiting.  Denies any chest pain.





On 09/26/2018 patient states she is feeling slightly improved.  Patient remains 

on IV Solu-Medrol and Levaquin IV antibiotic.  Patient denies chest pain.  

Patient states shortness of breath is improving.  Patient is having productive 

cough.  Patient denies nausea vomiting or diarrhea.  Patient denies any urinary 

burning or frequency.





09/27/2018 patient still reporting shortness of breath and cough.  She is 

feeling better since admission.  She does not feel quite ready for discharge 

yet.  Discussed with nursing staff will try to wean patient off of oxygen have 

her up and ambulating.  Patient denies any chest pain, nausea vomiting, bowel 

movement changes or urinary symptoms.





Objective





- Vital Signs


Vital signs: 


 Vital Signs











Temp  98.9 F   09/27/18 07:00


 


Pulse  78   09/27/18 12:41


 


Resp  16   09/27/18 07:00


 


BP  119/58   09/27/18 07:00


 


Pulse Ox  93 L  09/27/18 07:00








 Intake & Output











 09/26/18 09/27/18 09/27/18





 18:59 06:59 18:59


 


Other:   


 


  # Voids 2 2 














- Exam





Head normocephalic


Neck supple


Lungs few faint coarse breath sounds noted bilaterally


Heart regular rate and rhythm S1-S2, no rub or gallop


Abdomen is soft nontender nondistended positive bowel sounds no 

hepatosplenomegaly


Extremities no edema


Neuro alert and orientated to 3





- Labs


CBC & Chem 7: 


 09/27/18 07:55





 09/27/18 07:55


Labs: 


 Abnormal Lab Results - Last 24 Hours (Table)











  09/26/18 09/26/18 09/27/18 Range/Units





  17:05 22:15 07:54 


 


WBC     (3.8-10.6)  k/uL


 


Neutrophils #     (1.3-7.7)  k/uL


 


Chloride     ()  mmol/L


 


BUN     (7-17)  mg/dL


 


Glucose     (74-99)  mg/dL


 


POC Glucose (mg/dL)  154 H  161 H  146 H  (75-99)  mg/dL














  09/27/18 09/27/18 09/27/18 Range/Units





  07:55 07:55 11:42 


 


WBC  11.9 H    (3.8-10.6)  k/uL


 


Neutrophils #  10.1 H    (1.3-7.7)  k/uL


 


Chloride   108 H   ()  mmol/L


 


BUN   21 H   (7-17)  mg/dL


 


Glucose   151 H   (74-99)  mg/dL


 


POC Glucose (mg/dL)    160 H  (75-99)  mg/dL








 Microbiology - Last 24 Hours (Table)











 09/25/18 07:50 Blood Culture - Preliminary





 Blood    No Growth after 48 hours














Assessment and Plan


Assessment: 





1.  Shortness of breath with cough and congestion.  Possibly related to COPD 

exacerbation and bronchitis.  D-dimer negative





2.  Acute COPD exacerbation: Continue DuoNeb updrafts and IV Solu-Medrol.





3.  Acute tracheobronchitis: No pneumonia on chest x-ray.  Patient started on 

Levaquin.  She has a ALLERGY to penicillin.  Will add Mucinex also for her 

congestion





4.  History of PE and DVT diagnosed in 2016.  Continue Xarelto.  Doppler of 

right leg and this admission negative for DVT





5.  Hyperlipidemia statin on hold due to mildly elevated LFTs





6.  Mildly elevated LFTs: Repeat LFTs in a.m.  Hold statin





7.  Hypothyroidism continue Synthroid





8.  Oral candidiasis start nystatin swish and swallow





GI prophylaxis Pepcid and DVT prophylaxis Xarelto





Anticipate discharge home tomorrow.  We'll try to wean patient off.  2.  

Increase activity.  Consult physical therapy





I performed an examination of the patient and discussed their management with 

the physician Assistant.  I have reviewed the Physician Assistant's notes and 

agree with the documented findings and plan of care

## 2018-09-28 VITALS — TEMPERATURE: 97.8 F | SYSTOLIC BLOOD PRESSURE: 115 MMHG | DIASTOLIC BLOOD PRESSURE: 55 MMHG

## 2018-09-28 VITALS — HEART RATE: 80 BPM

## 2018-09-28 LAB
ALBUMIN SERPL-MCNC: 3.6 G/DL (ref 3.5–5)
ALP SERPL-CCNC: 56 U/L (ref 38–126)
ALT SERPL-CCNC: 47 U/L (ref 9–52)
ANION GAP SERPL CALC-SCNC: 10 MMOL/L
AST SERPL-CCNC: 29 U/L (ref 14–36)
BASOPHILS # BLD AUTO: 0 K/UL (ref 0–0.2)
BASOPHILS NFR BLD AUTO: 0 %
BUN SERPL-SCNC: 23 MG/DL (ref 7–17)
CALCIUM SPEC-MCNC: 9.2 MG/DL (ref 8.4–10.2)
CHLORIDE SERPL-SCNC: 107 MMOL/L (ref 98–107)
CO2 SERPL-SCNC: 23 MMOL/L (ref 22–30)
EOSINOPHIL # BLD AUTO: 0 K/UL (ref 0–0.7)
EOSINOPHIL NFR BLD AUTO: 0 %
ERYTHROCYTE [DISTWIDTH] IN BLOOD BY AUTOMATED COUNT: 4.18 M/UL (ref 3.8–5.4)
ERYTHROCYTE [DISTWIDTH] IN BLOOD: 13.6 % (ref 11.5–15.5)
GLUCOSE BLD-MCNC: 141 MG/DL (ref 75–99)
GLUCOSE BLD-MCNC: 144 MG/DL (ref 75–99)
GLUCOSE SERPL-MCNC: 221 MG/DL (ref 74–99)
HCT VFR BLD AUTO: 39.6 % (ref 34–46)
HGB BLD-MCNC: 12.6 GM/DL (ref 11.4–16)
LYMPHOCYTES # SPEC AUTO: 0.5 K/UL (ref 1–4.8)
LYMPHOCYTES NFR SPEC AUTO: 5 %
MCH RBC QN AUTO: 30.3 PG (ref 25–35)
MCHC RBC AUTO-ENTMCNC: 31.9 G/DL (ref 31–37)
MCV RBC AUTO: 94.7 FL (ref 80–100)
MONOCYTES # BLD AUTO: 0.4 K/UL (ref 0–1)
MONOCYTES NFR BLD AUTO: 4 %
NEUTROPHILS # BLD AUTO: 8.5 K/UL (ref 1.3–7.7)
NEUTROPHILS NFR BLD AUTO: 90 %
PLATELET # BLD AUTO: 271 K/UL (ref 150–450)
POTASSIUM SERPL-SCNC: 4.3 MMOL/L (ref 3.5–5.1)
PROT SERPL-MCNC: 6.2 G/DL (ref 6.3–8.2)
SODIUM SERPL-SCNC: 140 MMOL/L (ref 137–145)
WBC # BLD AUTO: 9.5 K/UL (ref 3.8–10.6)

## 2018-09-28 RX ADMIN — INSULIN ASPART SCH UNIT: 100 INJECTION, SOLUTION INTRAVENOUS; SUBCUTANEOUS at 07:52

## 2018-09-28 RX ADMIN — Medication SCH UNIT: at 07:51

## 2018-09-28 RX ADMIN — METHYLPREDNISOLONE SODIUM SUCCINATE SCH MG: 125 INJECTION, POWDER, FOR SOLUTION INTRAMUSCULAR; INTRAVENOUS at 00:26

## 2018-09-28 RX ADMIN — NYSTATIN SCH UNIT: 100000 SUSPENSION ORAL at 07:51

## 2018-09-28 RX ADMIN — METOPROLOL TARTRATE SCH MG: 25 TABLET, FILM COATED ORAL at 07:51

## 2018-09-28 RX ADMIN — METHYLPREDNISOLONE SODIUM SUCCINATE SCH MG: 125 INJECTION, POWDER, FOR SOLUTION INTRAMUSCULAR; INTRAVENOUS at 06:19

## 2018-09-28 RX ADMIN — IPRATROPIUM BROMIDE AND ALBUTEROL SULFATE SCH ML: .5; 3 SOLUTION RESPIRATORY (INHALATION) at 10:47

## 2018-09-28 RX ADMIN — IPRATROPIUM BROMIDE AND ALBUTEROL SULFATE SCH ML: .5; 3 SOLUTION RESPIRATORY (INHALATION) at 06:55

## 2018-09-28 RX ADMIN — METHYLPREDNISOLONE SODIUM SUCCINATE SCH: 125 INJECTION, POWDER, FOR SOLUTION INTRAMUSCULAR; INTRAVENOUS at 13:14

## 2018-09-28 RX ADMIN — FAMOTIDINE SCH MG: 20 TABLET, FILM COATED ORAL at 07:52

## 2018-09-28 RX ADMIN — FUROSEMIDE SCH MG: 20 TABLET ORAL at 07:51

## 2018-09-28 RX ADMIN — RIVAROXABAN SCH MG: 20 TABLET, FILM COATED ORAL at 07:52

## 2018-09-28 RX ADMIN — INSULIN ASPART SCH: 100 INJECTION, SOLUTION INTRAVENOUS; SUBCUTANEOUS at 13:14

## 2018-09-28 RX ADMIN — LEVOTHYROXINE SODIUM SCH MCG: 75 TABLET ORAL at 06:19

## 2018-09-28 RX ADMIN — LEVOFLOXACIN SCH MG: 500 TABLET, FILM COATED ORAL at 13:14

## 2018-09-28 RX ADMIN — NYSTATIN SCH UNIT: 100000 SUSPENSION ORAL at 13:14

## 2018-09-28 NOTE — P.DS
Providers


Date of admission: 


09/25/18 11:15





Expected date of discharge: 09/28/18


Attending physician: 


Erich Baez





Consults: 





 





09/25/18 13:48


Consult Physician Urgent 


   Consulting Provider: Everette Byrnes


   Consult Reason/Comments: SOB, Wheezing, COPD


   Do you want consulting provider notified?: Yes











Primary care physician: 


Elisha Mae





Hospital Course: 





Discharge diagnosis





1.  Shortness of breath with cough and congestion.  Secondary to COPD 

exacerbation and bronchitis.  D-dimer negative





2.  Acute COPD exacerbation: Continue prednisone taper





3.  Acute tracheobronchitis: No pneumonia on chest x-ray.  Continue Levaquin 

for 5 more days per pulmonary service





4.  History of PE and DVT diagnosed in 2016.  Continue Xarelto.  Doppler of 

right leg and this admission negative for DVT





5.  Hyperlipidemia 





6.  Mildly elevated LFTs: Resolved.  We'll restart statin





7.  Hypothyroidism continue Synthroid





8.  Oral candidiasis start nystatin swish and swallow continue for another 3 

days





Hospital course








This is a 62-year-old female, patient of Dr. Mae.  She has a known past 

medical history of left leg DVT and PE diagnosed in 2016 and she is 

anticoagulated with Xarelto.  She also has a history of hypothyroidism, COPD 

and hyperlipidemia.  She is a former smoker.  Patient reports that she has been 

having a cough with congestion and shortness of breath for almost a week.  She 

left denies states to travel to [place] on the 15th.  And while she was there 

developed congestion with runny nose also having sweats.  She's having a cough 

that keeps her up at night.  The cough is nonproductive.  And also worsening 

shortness of breath.  She presented to the emergency room for further 

evaluation and treatment.  Chest x-rays negative for any acute pulmonary 

process.  She started on IV Solu-Medrol and nebulizer treatments.  Patient 

reports that she did take a DuoNeb treatment at home with not much improvement.

  Patient was concerned about blood clots.  She has not missed any doses of her 

Xarelto.  She did complain of right leg tightness in the ER.  Doppler was 

completed and is negative for DVT.  She has been having issues with swelling in 

both legs and was started on Lasix by her family doctor.  Denies any history of 

congestive heart failure.  She ws admitted to the hospital for an acute COPD 

exacerbation and bronchitis.  Will place her on Levaquin.  She has an ALLERGY 

to penicillin.  Pulmonary service has been consulted.  Patient denies any fever 

or chills.  Denies a nausea vomiting.  Denies any chest pain.





On 09/26/2018 patient states she is feeling slightly improved.  Patient remains 

on IV Solu-Medrol and Levaquin IV antibiotic.  Patient denies chest pain.  

Patient states shortness of breath is improving.  Patient is having productive 

cough.  Patient denies nausea vomiting or diarrhea.  Patient denies any urinary 

burning or frequency.





09/27/2018 patient still reporting shortness of breath and cough.  She is 

feeling better since admission.  She does not feel quite ready for discharge 

yet.  Discussed with nursing staff will try to wean patient off of oxygen have 

her up and ambulating.  Patient denies any chest pain, nausea vomiting, bowel 

movement changes or urinary symptoms.





09/28/2018 patient is medically stable for discharge.  She's been cleared by 

pulmonary service for discharge as well.  She was treated for COPD exacerbation 

and bronchitis.  Which improved with IV Levaquin and IV Solu-Medrol and 

nebulizer treatments.  Patient was able to be weaned off the oxygen.  She's 

been up and ambulating.  Cough and shortness of breath are showing improvement.

  Pulmonary service has written prescriptions for prednisone taper and Levaquin 

for 5 more days.  Also continue nebulizer treatments.  Patient's symptoms have 

improved she is medically stable for discharge.  Please refer to chart for any 

further details.





I performed an examination of the patient and discussed their management with 

the physician Assistant.  I have reviewed the Physician Assistant's notes and 

agree with the documented findings and plan of care


Patient Condition at Discharge: Stable





Plan - Discharge Summary


New Discharge Prescriptions: 


New


   Ipratropium-Albuterol Nebulize [Duoneb 0.5 mg-3 mg/3 ml Soln] 3 ml 

INHALATION QID 30 Days #5 box


   predniSONE 10 mg PO DAILY 12 Days #24 tab


   Levofloxacin [Levaquin] 500 mg PO DAILY 5 Days #5 tab


   guaiFENesin [Mucinex] 1,200 mg PO Q12HR #10 tablet.er


   guaiFENesin-Coden 100-10MG/5ML [Robitussin AC] 10 ml PO BID #100 ml


   Nystatin 100,000 Unit/ml Susp [Mycostatin Oral Susp] 500,000 unit PO QID #12 

cup





Continue


   Levothyroxine Sodium [Synthroid] 150 mcg PO DAILY


   Furosemide [Lasix] 20 mg PO DAILY


   Metoprolol Tartrate 25 mg PO DAILY


   Atorvastatin [Lipitor] 40 mg PO HS


   Rivaroxaban [Xarelto] 20 mg PO DAILY


   Cholecalciferol [Vitamin D3] 3,000 unit PO DAILY


Discharge Medication List





Levothyroxine Sodium [Synthroid] 150 mcg PO DAILY 02/10/17 [History]


Atorvastatin [Lipitor] 40 mg PO HS 12/09/17 [History]


Furosemide [Lasix] 20 mg PO DAILY 12/09/17 [History]


Metoprolol Tartrate 25 mg PO DAILY 12/09/17 [History]


Cholecalciferol [Vitamin D3] 3,000 unit PO DAILY 07/16/18 [History]


Rivaroxaban [Xarelto] 20 mg PO DAILY 07/16/18 [History]


Ipratropium-Albuterol Nebulize [Duoneb 0.5 mg-3 mg/3 ml Soln] 3 ml INHALATION 

QID 30 Days #5 box 09/27/18 [Rx]


Levofloxacin [Levaquin] 500 mg PO DAILY 5 Days #5 tab 09/27/18 [Rx]


predniSONE 10 mg PO DAILY 12 Days #24 tab 09/27/18 [Rx]


Nystatin 100,000 Unit/ml Susp [Mycostatin Oral Susp] 500,000 unit PO QID #12 

cup 09/28/18 [Rx]


guaiFENesin [Mucinex] 1,200 mg PO Q12HR #10 tablet.er 09/28/18 [Rx]


guaiFENesin-Coden 100-10MG/5ML [Robitussin AC] 10 ml PO BID #100 ml 09/28/18 [Rx

]








Follow up Appointment(s)/Referral(s): 


Elisha Mae MD [Primary Care Provider] - 1 Week


Everette Byrnes DO [Doctor of Osteopathic Medicine] - 1 Week


Patient Instructions/Handouts:  COPD (Chronic Obstructive Pulmonary Disease) (DC

)


Activity/Diet/Wound Care/Special Instructions: 


Cardiac diet.





Activity as tolerated. 


Discharge Disposition: HOME SELF-CARE

## 2018-09-28 NOTE — P.PN
Subjective


Progress Note Date: 09/28/18


Principal diagnosis: 


Acute bronchitis with reactive bronchospasm and bronchial inflammation.





Pulmonary consult dated 09/26/2018





62-year-old female who presents to the emergency department with complaints of 

cough congestion shortness of breath.  She apparently was recently in [place].  

She apparently got sick there.  It started off as an upper respiratory tract 

infection and this settled into her chest.  She had chest congestion coughing 

wheezing and shortness of breath.  She does have a history of DVT and previous 

pulmonary embolism.  She is on lifelong blood thinner.  She is feeling better 

today.  She denies any chest pain or chest discomfort.  There was no fever or 

chills.  No nausea vomiting or diarrhea.  She does not have a history of 

underlying intrinsic pulmonary disease.  She was last seen by me in February 2018.  Pulmonary function testing was normal.  She is feeling better today and 

probably could be discharged home later today or tomorrow.  Her likely 

diagnosis is acute bronchitis with reactive bronchospasm and bronchial 

inflammation.  Her chest x-ray did not show an acute process.  In addition, she 

did have a Doppler of the right lower extremity which was negative.  She does 

have a history of angina, hyperlipidemia, pneumonia, pulmonary embolism, 

hypothyroidism, hyperlipidemia, and a DVT. 





On 09/27/2018 patient seen in follow-up medical surgical floor.  Doing much 

better today, no shortness of breath, coughing less.  Lung sounds are positive 

for a few residual wheezes, but overall patient is clinically improved.  

Afebrile, vital signs are stable.  He has been treated with IV steroids, 

nebulized bronchodilators, and antibiotics, and has responded well to 

treatments.  Requesting to go home today.





On 09/20/2018 patient seen again in follow-up on medical surgical floor.  

Continues to improve.  On sounds are positive for a few scattered end 

expiratory wheezes, overall feeling and breathing easier.  On room air, pulse 

ox is 95%, she is afebrile.  Blood cultures are negative.  No acute issues 

overnight.  From pulmonary perspective patient is stable for discharge home 

today.  Today's labs have been reviewed,  are essentially unremarkable.








Objective





- Vital Signs


Vital signs: 


 Vital Signs











Temp  97.8 F   09/28/18 06:37


 


Pulse  80   09/28/18 10:57


 


Resp  16   09/28/18 06:37


 


BP  115/55   09/28/18 06:37


 


Pulse Ox  95   09/28/18 06:37








 Intake & Output











 09/27/18 09/28/18 09/28/18





 18:59 06:59 18:59


 


Other:   


 


  # Voids 3 1 














- Exam


GENERAL EXAM: Alert, pleasant, 62-year-old white female comfortable in no 

apparent distress.


HEAD: Normocephalic/atraumatic.


EYES: Normal reaction of pupils, equal size.  Conjunctiva pink, sclera white.


NOSE: Clear with pink turbinates.


THROAT: No erythema or exudates.


NECK: No masses, no JVD, no thyroid enlargement, no adenopathy.


CHEST: No chest wall deformity.  Symmetrical expansion. 


LUNGS: Equal air entry with no crackles, wheeze, rhonchi or dullness.


CVS: Regular rate and rhythm, normal S1 and S2, no gallops, no murmurs, no rubs


ABDOMEN: Soft, nontender.  No hepatosplenomegaly, normal bowel sounds, no 

guarding or rigidity.


EXTREMITIES: No clubbing, no edema, no cyanosis, 2+ pulses and upper and lower 

extremities.


MUSCULOSKELETAL: Muscle strength and tone normal.


SPINE: No scoliosis or deformity


SKIN: No rashes


CENTRAL NERVOUS SYSTEM: Alert and oriented -3.  No focal deficits, tone is 

normal in all 4 extremities.


PSYCHIATRIC: Alert and oriented -3.  Appropriate affect.  Intact judgment and 

insight.











- Labs


CBC & Chem 7: 


 09/28/18 08:54





 09/28/18 08:54


Labs: 


 Abnormal Lab Results - Last 24 Hours (Table)











  09/27/18 09/27/18 09/28/18 Range/Units





  17:10 20:47 07:25 


 


Neutrophils #     (1.3-7.7)  k/uL


 


Lymphocytes #     (1.0-4.8)  k/uL


 


BUN     (7-17)  mg/dL


 


Glucose     (74-99)  mg/dL


 


POC Glucose (mg/dL)  170 H  176 H  144 H  (75-99)  mg/dL


 


Total Protein     (6.3-8.2)  g/dL














  09/28/18 09/28/18 Range/Units





  08:54 08:54 


 


Neutrophils #  8.5 H   (1.3-7.7)  k/uL


 


Lymphocytes #  0.5 L   (1.0-4.8)  k/uL


 


BUN   23 H  (7-17)  mg/dL


 


Glucose   221 H  (74-99)  mg/dL


 


POC Glucose (mg/dL)    (75-99)  mg/dL


 


Total Protein   6.2 L  (6.3-8.2)  g/dL








 Microbiology - Last 24 Hours (Table)











 09/25/18 07:50 Blood Culture - Preliminary





 Blood    No Growth after 72 hours














Assessment and Plan


Plan: 


Acute bronchitis with reactive bronchospasm and bronchial inflammation





Recent acute upper respiratory tract infection





History of DVT and pulmonary embolism, currently on lifelong anticoagulation





History of hyperlipidemia





History of pneumonia





History of hypothyroidism





Obesity





No history of intrinsic pulmonary disease





Plan:





Stable, improving.  Vital signs are stable, on room air, ambulating.  From 

pulmonary perspective patient is stable for discharge home today on outpatient 

course of antibiotics, prednisone taper, and nebulized bronchodilators.  Follow-

up with Dr. Byrnes in the office in one week





I performed a history & physical examination of the patient and discussed their 

management with my nurse practitioner, Marilou Arroyo.  I reviewed the nurse 

practitioner's note and agree with the documented findings and plan of care.  

Lung sounds are positive for a few wheezes.  The findings and the impression 

was discussed with the patient.  I attest to the documentation by the nurse 

practitioner. 





Time with Patient: Less than 30

## 2019-09-13 ENCOUNTER — HOSPITAL ENCOUNTER (OUTPATIENT)
Dept: HOSPITAL 47 - RADMAMWWP | Age: 64
Discharge: HOME | End: 2019-09-13
Attending: FAMILY MEDICINE
Payer: COMMERCIAL

## 2019-09-13 DIAGNOSIS — Z12.31: Primary | ICD-10-CM

## 2019-09-13 PROCEDURE — 77067 SCR MAMMO BI INCL CAD: CPT

## 2019-09-16 NOTE — MM
Reason for exam: screening  (asymptomatic).

Last mammogram was performed 1 year and 1 month ago.



History:

Patient is postmenopausal.



Physical Findings:

A clinical breast exam by your physician is recommended on an annual basis and 

results should be correlated with mammographic findings.



MG Screening Mammo w CAD

Bilateral CC and MLO view(s) were taken.

Prior study comparison: August 20, 2018, bilateral MG screening mammo w CAD.  June 26, 2017, bilateral MG screening mammo w CAD.

There are scattered fibroglandular densities.  There is chronic nodularity in the 

right breast.  No significant changes when compared with prior studies.





ASSESSMENT: Benign, BI-RAD 2



RECOMMENDATION:

Routine screening mammogram of both breasts in 1 year.

## 2020-02-27 ENCOUNTER — HOSPITAL ENCOUNTER (OUTPATIENT)
Dept: HOSPITAL 47 - RADMRIMAIN | Age: 65
Discharge: HOME | End: 2020-02-27
Attending: NURSE PRACTITIONER
Payer: MEDICARE

## 2020-02-27 DIAGNOSIS — Q28.2: Primary | ICD-10-CM

## 2020-02-27 PROCEDURE — 70544 MR ANGIOGRAPHY HEAD W/O DYE: CPT

## 2020-02-27 PROCEDURE — 70553 MRI BRAIN STEM W/O & W/DYE: CPT

## 2020-02-27 NOTE — MR
EXAMINATION TYPE: MR brain wo/w con

 

DATE OF EXAM: 2/27/2020

 

COMPARISON: None

 

HISTORY: Arterial venous malformation

 

CONTRAST:  Performed utilizing 13 mL intravenous Gadavist gadolinium contrast.  

 

TECHNIQUE: Multiplanar, multiecho imaging on a 3.0 Ligia magnet is performed through the brain.  Stud
y is performed within 24 hours of arrival to the hospital.

 

The craniovertebral junction is normal.  The pituitary is normal.  

 

Diffusion-weighted imaging is performed.  No abnormal hyperintensity is present to suggest an acute i
ntracranial infarct or acute ischemic change.

 

There are scattered tiny punctate areas of hyperintensity on T2 and Inversion Recovery weighted seque
nces which are non-specific but can be related to microvascular ischemic changes. 

 

Ventricles and sulci are appropriate for the patient age.

 

No areas of abnormal enhancement is evident. No tangle of vessels to suggest arterial venous malforma
tion is evident.

 

IMPRESSIONS:

1. No dural venous malformations evident.

2. Minimal white matter ischemic type changes without significant atrophy.

## 2020-02-27 NOTE — MR
EXAMINATION TYPE: MR angio head wo con

 

DATE OF EXAM: 2/27/2020

 

COMPARISON: MRI brain same date

 

HISTORY: Arteriovenous malformation

 

CONTRAST: None

 

TECHNIQUE: Multiplanar multiecho imaging on a 3.0 Ligia magnet is performed through the [place].  3-D time-of-flight imaging is performed.  Source images are reviewed on the computer in the axi
al plane.  Reconstructed images rotating on the computer are reviewed. 

 

FINDINGS: The internal carotid arteries bifurcate normally into A1 and M1 segments.  The A2 segments 
are normal.  Middle cerebral artery branches are normal.  Ophthalmic arteries appear normal.

 

Anterior communicating artery is patent.  Small azygos vessel may arise from the communicating artery
.

The right posterior communicating artery is patent.  

The left posterior communicating artery is patent. 

 

Vertebrobasilar arteries within the field-of-view are normal.  Posterior cerebral vasculature is norm
al.  No suspicious aneurysm or aneurysmal dilatation is evident.  No obstructions are identified.  No
 significant flow-limiting stenosis is evident.  

 

IMPRESSIONS:

1. NORMAL MRA [place].  

2. No suspicious changes to suggest arteriovenous malformation.